# Patient Record
Sex: MALE | Race: WHITE | NOT HISPANIC OR LATINO | Employment: FULL TIME | URBAN - NONMETROPOLITAN AREA
[De-identification: names, ages, dates, MRNs, and addresses within clinical notes are randomized per-mention and may not be internally consistent; named-entity substitution may affect disease eponyms.]

---

## 2017-02-13 RX ORDER — LOSARTAN POTASSIUM AND HYDROCHLOROTHIAZIDE 25; 100 MG/1; MG/1
TABLET ORAL
Qty: 90 TABLET | Refills: 3 | Status: SHIPPED | OUTPATIENT
Start: 2017-02-13 | End: 2018-03-14 | Stop reason: SDUPTHER

## 2017-02-13 RX ORDER — CARVEDILOL 6.25 MG/1
TABLET ORAL
Qty: 180 TABLET | Refills: 3 | Status: SHIPPED | OUTPATIENT
Start: 2017-02-13 | End: 2018-06-28 | Stop reason: SDUPTHER

## 2017-02-23 ENCOUNTER — OFFICE VISIT (OUTPATIENT)
Dept: INTERNAL MEDICINE | Facility: CLINIC | Age: 42
End: 2017-02-23

## 2017-02-23 VITALS
BODY MASS INDEX: 28.17 KG/M2 | DIASTOLIC BLOOD PRESSURE: 90 MMHG | OXYGEN SATURATION: 98 % | HEIGHT: 72 IN | WEIGHT: 208 LBS | HEART RATE: 78 BPM | SYSTOLIC BLOOD PRESSURE: 140 MMHG

## 2017-02-23 DIAGNOSIS — R00.2 PALPITATIONS: Primary | ICD-10-CM

## 2017-02-23 PROCEDURE — 99213 OFFICE O/P EST LOW 20 MIN: CPT | Performed by: INTERNAL MEDICINE

## 2017-02-23 NOTE — PROGRESS NOTES
Subjective  Jan Vyas is a 41 y.o. male    HPI coming in with complains of occasional palpitations with fleeting chest pain without associated cough or hemoptysis. Pain associated with taking a deep breath. No recent travel or leg swelling has started exercising and weightlifting again. Has been taking carvedilol only once a day history of hypertension. Recent stressors at home    The following portions of the patient's history were reviewed and updated as appropriate: allergies, current medications, past family history, past medical history, past social history, past surgical history, and problem list.     Review of Systems   Constitutional: Negative.  Negative for activity change, appetite change, fatigue and fever.   HENT: Negative for congestion, ear discharge, ear pain and trouble swallowing.    Eyes: Negative for photophobia and visual disturbance.   Respiratory: Negative for cough and shortness of breath.    Cardiovascular: Positive for palpitations. Negative for chest pain.   Gastrointestinal: Negative for abdominal distention, abdominal pain, constipation, diarrhea, nausea and vomiting.   Endocrine: Negative.    Genitourinary: Negative for dysuria, hematuria and urgency.        Nocturia   Musculoskeletal: Negative for arthralgias, back pain, joint swelling and myalgias.   Skin: Negative for color change and rash.   Allergic/Immunologic: Negative.    Neurological: Negative for dizziness, weakness, light-headedness and headaches.   Hematological: Negative for adenopathy. Does not bruise/bleed easily.   Psychiatric/Behavioral: Negative for agitation, confusion and dysphoric mood. The patient is nervous/anxious.        Vitals:    02/23/17 1425   BP: 140/90   Pulse: 78   SpO2: 98%       Objective  Physical Exam   Constitutional: He is oriented to person, place, and time. He appears well-developed and well-nourished. No distress.   HENT:   Nose: Nose normal.   Mouth/Throat: Oropharynx is clear and  moist.   Eyes: Conjunctivae and EOM are normal. No scleral icterus.   Neck: No tracheal deviation present. No thyromegaly present.   Cardiovascular: Normal rate and regular rhythm.  Exam reveals no friction rub.    No murmur heard.  Pulmonary/Chest: No respiratory distress. He has no wheezes. He has no rales.   Abdominal: Soft. He exhibits no distension and no mass. There is no tenderness. There is no guarding.   Musculoskeletal: Normal range of motion. He exhibits no deformity.   Lymphadenopathy:     He has no cervical adenopathy.   Neurological: He is alert and oriented to person, place, and time. He has normal reflexes. No cranial nerve deficit. Coordination normal.   Skin: Skin is warm and dry. No rash noted. No erythema.   Psychiatric: He has a normal mood and affect. His behavior is normal. Judgment and thought content normal.       Diagnoses and all orders for this visit:    Palpitations exam unremarkable advised compliance with carvedilol patient reassured. Advised to cut down on caffeine intake. Denies significant alcohol use

## 2017-03-28 ENCOUNTER — TELEPHONE (OUTPATIENT)
Dept: INTERNAL MEDICINE | Facility: CLINIC | Age: 42
End: 2017-03-28

## 2017-03-28 NOTE — TELEPHONE ENCOUNTER
----- Message from Jan Burtflex sent at 3/28/2017 12:44 PM EDT -----  Regarding: Non-Urgent Medical Question  Contact: [phone]  Dr. Hadley,    Greetings to you. Some time back, I met with a doctor in the office concerning overall attention. He gave me a med to take which I think I took for a day or two and discontinued. The past two days, I have taken adderall - yesterday I too a fourth of a 20 mg pill and today I took a half of a 20 mg. I've monitored blood pressure today and has ran in the 140/95 range and heart rate as ran between 70-80 bpm. I do feel more focused - driven (for lack of a better description???) but I'm (overly) conscious of BP and heart rate.  I would love to discuss. Many thanks!!!! Your neurotic best patient -Jan Asael

## 2017-03-28 NOTE — TELEPHONE ENCOUNTER
----- Message from Jan Burtflex sent at 3/28/2017 12:44 PM EDT -----  Regarding: Non-Urgent Medical Question  Contact: [phone]  Dr. Hadley,    Greetings to you. Some time back, I met with a doctor in the office concerning overall attention. He gave me a med to take which I think I took for a day or two and discontinued. The past two days, I have taken adderall - yesterday I too a fourth of a 20 mg pill and today I took a half of a 20 mg. I've monitored blood pressure today and has ran in the 140/95 range and heart rate as ran between 70-80 bpm. I do feel more focused - driven (for lack of a better description???) but I'm (overly) conscious of BP and heart rate.  I would love to discuss. Many thanks!!!! Your neurotic best patient -Jan Asael

## 2017-03-29 ENCOUNTER — TELEPHONE (OUTPATIENT)
Dept: INTERNAL MEDICINE | Facility: CLINIC | Age: 42
End: 2017-03-29

## 2017-03-29 NOTE — TELEPHONE ENCOUNTER
----- Message from Jan Vyas sent at 3/28/2017  1:19 PM EDT -----  Regarding: Non-Urgent Medical Question  Contact: [phone]  Dr. Hadley,    I was reading about the drug the other doctor prescribed for me - Strattera - and noticed that was was used to treat both attention, focus and anxiety. I wonder if this would make a substitute for Lexapro? I'm not necessarily wanting to change anything just was doing what you advise me not to do - read up on meds and conditions. ~Jan

## 2017-03-30 ENCOUNTER — TELEPHONE (OUTPATIENT)
Dept: INTERNAL MEDICINE | Facility: CLINIC | Age: 42
End: 2017-03-30

## 2017-03-30 NOTE — TELEPHONE ENCOUNTER
"----- Message from Jan Vyas sent at 3/29/2017  6:08 PM EDT -----  Regarding: Non-Urgent Medical Question  Contact: [phone]  Opal contacted me and said Strattera would be $400+. To that I say, \"No.\" Thoughts? I'll wait for . Thanks!!!  "

## 2017-08-11 RX ORDER — MONTELUKAST SODIUM 10 MG/1
TABLET ORAL
Qty: 90 TABLET | Refills: 3 | Status: SHIPPED | OUTPATIENT
Start: 2017-08-11 | End: 2018-06-28 | Stop reason: SDUPTHER

## 2017-11-08 ENCOUNTER — OFFICE VISIT (OUTPATIENT)
Dept: INTERNAL MEDICINE | Facility: CLINIC | Age: 42
End: 2017-11-08

## 2017-11-08 VITALS
OXYGEN SATURATION: 95 % | TEMPERATURE: 98 F | HEIGHT: 72 IN | SYSTOLIC BLOOD PRESSURE: 125 MMHG | WEIGHT: 207.13 LBS | HEART RATE: 74 BPM | DIASTOLIC BLOOD PRESSURE: 80 MMHG | BODY MASS INDEX: 28.05 KG/M2

## 2017-11-08 DIAGNOSIS — M77.12 LATERAL EPICONDYLITIS OF LEFT ELBOW: Primary | ICD-10-CM

## 2017-11-08 PROCEDURE — 99213 OFFICE O/P EST LOW 20 MIN: CPT | Performed by: INTERNAL MEDICINE

## 2017-11-08 NOTE — PROGRESS NOTES
"Subjective  Jan Vyas is a 42 y.o. male    HPI coming in with complains of left forearm discomfort especially on the lateral side ongoing for a few weeks now. Exacerbated by heavy lifting. Does do a lot of weight training at the gym. No new trauma has not tried any medication for this    The following portions of the patient's history were reviewed and updated as appropriate: allergies, current medications, past family history, past medical history, past social history, past surgical history, and problem list.     Review of Systems   Constitutional: Negative.  Negative for activity change, appetite change, fatigue and fever.   HENT: Negative for congestion, ear discharge, ear pain and trouble swallowing.    Eyes: Negative for photophobia and visual disturbance.   Respiratory: Negative for cough and shortness of breath.    Cardiovascular: Negative for chest pain and palpitations.   Gastrointestinal: Negative for abdominal distention, abdominal pain, constipation, diarrhea, nausea and vomiting.   Endocrine: Negative.    Genitourinary: Negative for dysuria, hematuria and urgency.   Musculoskeletal: Positive for arthralgias. Negative for back pain, joint swelling and myalgias.        Lt elbow pain   Skin: Negative for color change and rash.   Allergic/Immunologic: Negative.    Neurological: Negative for dizziness, weakness, light-headedness and headaches.   Hematological: Negative for adenopathy. Does not bruise/bleed easily.   Psychiatric/Behavioral: Negative for agitation, confusion and dysphoric mood. The patient is not nervous/anxious.        Visit Vitals   • /80   • Pulse 74   • Temp 98 °F (36.7 °C)   • Ht 72\" (182.9 cm)   • Wt 207 lb 2 oz (94 kg)   • SpO2 95%   • BMI 28.09 kg/m2       Objective  Physical Exam   Constitutional: He is oriented to person, place, and time. He appears well-developed and well-nourished. No distress.   HENT:   Nose: Nose normal.   Mouth/Throat: Oropharynx is clear and " moist.   Eyes: Conjunctivae and EOM are normal. No scleral icterus.   Neck: No tracheal deviation present. No thyromegaly present.   Cardiovascular: Normal rate and regular rhythm.  Exam reveals no friction rub.    No murmur heard.  Pulmonary/Chest: No respiratory distress. He has no wheezes. He has no rales.   Abdominal: Soft. He exhibits no distension and no mass. There is no tenderness. There is no guarding.   Musculoskeletal: Normal range of motion. He exhibits tenderness. He exhibits no deformity.   Lymphadenopathy:     He has no cervical adenopathy.   Neurological: He is alert and oriented to person, place, and time. He has normal reflexes. No cranial nerve deficit. Coordination normal.   Skin: Skin is warm and dry. No rash noted. No erythema.   Psychiatric: He has a normal mood and affect. His behavior is normal. Judgment and thought content normal.       Diagnoses and all orders for this visit:    Lateral epicondylitis of left elbow discussed splinting prescription given intermittent icing avoid heavy lifting. Discussed prognosis

## 2018-03-14 RX ORDER — LOSARTAN POTASSIUM AND HYDROCHLOROTHIAZIDE 25; 100 MG/1; MG/1
TABLET ORAL
Qty: 90 TABLET | Refills: 2 | Status: SHIPPED | OUTPATIENT
Start: 2018-03-14 | End: 2018-11-11 | Stop reason: SDUPTHER

## 2018-03-14 RX ORDER — ESCITALOPRAM OXALATE 10 MG/1
10 TABLET ORAL DAILY
Qty: 90 TABLET | Refills: 3 | Status: SHIPPED | OUTPATIENT
Start: 2018-03-14 | End: 2019-05-02 | Stop reason: SDUPTHER

## 2018-06-01 ENCOUNTER — TELEPHONE (OUTPATIENT)
Dept: INTERNAL MEDICINE | Facility: CLINIC | Age: 43
End: 2018-06-01

## 2018-06-01 RX ORDER — LEVOCETIRIZINE DIHYDROCHLORIDE 5 MG/1
5 TABLET, FILM COATED ORAL EVERY EVENING
Qty: 30 TABLET | Refills: 5 | Status: SHIPPED | OUTPATIENT
Start: 2018-06-01 | End: 2019-01-03 | Stop reason: SDUPTHER

## 2018-06-01 NOTE — TELEPHONE ENCOUNTER
Patient would like a script for Xyzal sent to [facility]. States that the [facility] is no longer working for him this allergy season. Please call patient.

## 2018-06-29 RX ORDER — CARVEDILOL 6.25 MG/1
TABLET ORAL
Qty: 180 TABLET | Refills: 0 | Status: SHIPPED | OUTPATIENT
Start: 2018-06-29 | End: 2018-10-03 | Stop reason: SDUPTHER

## 2018-06-29 RX ORDER — MONTELUKAST SODIUM 10 MG/1
TABLET ORAL
Qty: 90 TABLET | Refills: 0 | Status: SHIPPED | OUTPATIENT
Start: 2018-06-29 | End: 2018-10-03 | Stop reason: SDUPTHER

## 2018-10-03 RX ORDER — CARVEDILOL 6.25 MG/1
6.25 TABLET ORAL 2 TIMES DAILY WITH MEALS
Qty: 180 TABLET | Refills: 0 | Status: SHIPPED | OUTPATIENT
Start: 2018-10-03 | End: 2019-06-08 | Stop reason: SDUPTHER

## 2018-10-03 RX ORDER — MONTELUKAST SODIUM 10 MG/1
TABLET ORAL
Qty: 90 TABLET | Refills: 0 | Status: SHIPPED | OUTPATIENT
Start: 2018-10-03 | End: 2019-03-05 | Stop reason: SDUPTHER

## 2018-11-12 RX ORDER — LOSARTAN POTASSIUM AND HYDROCHLOROTHIAZIDE 25; 100 MG/1; MG/1
TABLET ORAL
Qty: 90 TABLET | Refills: 3 | Status: SHIPPED | OUTPATIENT
Start: 2018-11-12 | End: 2019-10-28 | Stop reason: SDUPTHER

## 2019-01-04 RX ORDER — LEVOCETIRIZINE DIHYDROCHLORIDE 5 MG/1
TABLET, FILM COATED ORAL
Qty: 30 TABLET | Refills: 11 | Status: SHIPPED | OUTPATIENT
Start: 2019-01-04 | End: 2019-10-24 | Stop reason: SDUPTHER

## 2019-01-25 ENCOUNTER — OFFICE VISIT (OUTPATIENT)
Dept: INTERNAL MEDICINE | Facility: CLINIC | Age: 44
End: 2019-01-25

## 2019-01-25 VITALS
SYSTOLIC BLOOD PRESSURE: 128 MMHG | TEMPERATURE: 98 F | DIASTOLIC BLOOD PRESSURE: 98 MMHG | BODY MASS INDEX: 29.26 KG/M2 | HEIGHT: 72 IN | OXYGEN SATURATION: 98 % | WEIGHT: 216 LBS | HEART RATE: 81 BPM

## 2019-01-25 DIAGNOSIS — Z78.9 ALCOHOL USE: ICD-10-CM

## 2019-01-25 DIAGNOSIS — Z23 NEED FOR TDAP VACCINATION: ICD-10-CM

## 2019-01-25 DIAGNOSIS — K21.9 GASTROESOPHAGEAL REFLUX DISEASE, ESOPHAGITIS PRESENCE NOT SPECIFIED: ICD-10-CM

## 2019-01-25 DIAGNOSIS — I10 ESSENTIAL HYPERTENSION: ICD-10-CM

## 2019-01-25 DIAGNOSIS — J01.40 ACUTE PANSINUSITIS, RECURRENCE NOT SPECIFIED: Primary | ICD-10-CM

## 2019-01-25 PROCEDURE — 90471 IMMUNIZATION ADMIN: CPT | Performed by: NURSE PRACTITIONER

## 2019-01-25 PROCEDURE — 99214 OFFICE O/P EST MOD 30 MIN: CPT | Performed by: NURSE PRACTITIONER

## 2019-01-25 PROCEDURE — 90715 TDAP VACCINE 7 YRS/> IM: CPT | Performed by: NURSE PRACTITIONER

## 2019-01-25 RX ORDER — DOXYCYCLINE 100 MG/1
100 TABLET ORAL 2 TIMES DAILY
Qty: 20 TABLET | Refills: 0 | Status: SHIPPED | OUTPATIENT
Start: 2019-01-25 | End: 2019-02-04

## 2019-01-25 RX ORDER — FLUTICASONE PROPIONATE 50 MCG
2 SPRAY, SUSPENSION (ML) NASAL DAILY
Qty: 1 BOTTLE | Refills: 5 | Status: SHIPPED | OUTPATIENT
Start: 2019-01-25 | End: 2020-04-06

## 2019-01-25 RX ORDER — RIZATRIPTAN BENZOATE 10 MG/1
10 TABLET ORAL ONCE AS NEEDED
Qty: 9 TABLET | Refills: 5 | Status: SHIPPED | OUTPATIENT
Start: 2019-01-25 | End: 2021-02-02 | Stop reason: ALTCHOICE

## 2019-01-25 RX ORDER — RANITIDINE 300 MG/1
300 TABLET ORAL NIGHTLY
Qty: 30 TABLET | Refills: 1 | Status: SHIPPED | OUTPATIENT
Start: 2019-01-25 | End: 2020-01-13

## 2019-01-25 NOTE — PROGRESS NOTES
Chief Complaint / Reason:      Chief Complaint   Patient presents with   • Cough     cold. no fever. congestion. headache late at hs.    • Hypertension     talk about recalls and losartan.    • Heartburn     makes him cough       Subjective     HPI  Patient presents today with complaints of cough and congestion headache heartburn and elevated blood pressure.  He states he feels like he has pain on the right side he is not up-to-date on his vision.  He denies fever or chills.  He states he is taking his medication as prescribed he would like to discuss recalls about losartan and I advised him to contact the pharmacy and discussed other options with him such as lisinopril and Avapro.  Patient states his respiratory symptoms started last Friday and he feels like they initially were improving but have worsened over the past few days.  Vital signs are stable with exception of slightly elevated blood pressure.  He states he does drink quite a bit of coffee and does drink alcohol nightly.  He occasionally dips but not often.  He states he has been under a lot of stress as he has been going through divorce.  Denies chest pain, shortness of breath or heart palpitations.  Patient is a principal at [employer].    History taken from: patient    PMH/FH/Social History were reviewed and updated appropriately in the electronic medical record.     Review of Systems:   Review of Systems   Constitutional: Positive for fatigue.   HENT: Positive for congestion, sinus pressure, sinus pain and sore throat.    Respiratory: Positive for cough.    Cardiovascular: Negative.    Gastrointestinal: Negative.    Skin: Negative.    Neurological: Positive for headaches.   Hematological: Negative for adenopathy.     All other systems were reviewed and are negative.  Exceptions are noted in the subjective or above.      Objective     Vital Signs  Vitals:    01/25/19 1315   BP: 128/98   Pulse: 81   Temp: 98 °F (36.7 °C)   SpO2: 98%       Body mass index  is 29.29 kg/m².    Physical Exam   Constitutional: He is oriented to person, place, and time. He appears well-developed and well-nourished.   HENT:   Head: Normocephalic.   Right Ear: External ear normal. Tympanic membrane is erythematous and bulging.   Left Ear: External ear normal. Tympanic membrane is erythematous and bulging.   Nose: Right sinus exhibits maxillary sinus tenderness and frontal sinus tenderness. Left sinus exhibits maxillary sinus tenderness and frontal sinus tenderness.   Mouth/Throat: Mucous membranes are dry. Posterior oropharyngeal erythema present.   Cardiovascular: Normal rate, regular rhythm, normal heart sounds and intact distal pulses.   Pulmonary/Chest: Effort normal and breath sounds normal.   Abdominal: Soft. Bowel sounds are normal.   Lymphadenopathy:     He has no cervical adenopathy.   Neurological: He is alert and oriented to person, place, and time.   Skin: Skin is warm and dry.   Psychiatric: He has a normal mood and affect. His behavior is normal. Judgment and thought content normal.   Nursing note and vitals reviewed.       Results Review:    I reviewed the patient's new clinical results.       Medication Review:     Current Outpatient Medications:   •  carvedilol (COREG) 6.25 MG tablet, Take 1 tablet by mouth 2 (Two) Times a Day With Meals. Must be seen for further refills, Disp: 180 tablet, Rfl: 0  •  escitalopram (LEXAPRO) 10 MG tablet, Take 1 tablet by mouth Daily. (Patient taking differently: Take 5 mg by mouth Daily.), Disp: 90 tablet, Rfl: 3  •  ibuprofen (ADVIL,MOTRIN) 800 MG tablet, Take  by mouth., Disp: , Rfl:   •  levocetirizine (XYZAL) 5 MG tablet, TAKE ONE TABLET BY MOUTH EVERY EVENING, Disp: 30 tablet, Rfl: 11  •  losartan-hydrochlorothiazide (HYZAAR) 100-25 MG per tablet, TAKE ONE TABLET BY MOUTH DAILY, Disp: 90 tablet, Rfl: 3  •  montelukast (SINGULAIR) 10 MG tablet, TAKE ONE TABLET BY MOUTH DAILY, Disp: 90 tablet, Rfl: 0  •  doxycycline (ADOXA) 100 MG tablet,  Take 1 tablet by mouth 2 (Two) Times a Day for 10 days., Disp: 20 tablet, Rfl: 0  •  fluticasone (FLONASE) 50 MCG/ACT nasal spray, 2 sprays into the nostril(s) as directed by provider Daily., Disp: 1 bottle, Rfl: 5  •  raNITIdine (ZANTAC) 300 MG tablet, Take 1 tablet by mouth Every Night., Disp: 30 tablet, Rfl: 1  •  rizatriptan (MAXALT) 10 MG tablet, Take 1 tablet by mouth 1 (One) Time As Needed for Migraine for up to 1 dose. May repeat in 2 hours if needed, Disp: 9 tablet, Rfl: 5    Assessment/Plan   Jan was seen today for cough, hypertension and heartburn.    Diagnoses and all orders for this visit:    Acute pansinusitis, recurrence not specified  -     doxycycline (ADOXA) 100 MG tablet; Take 1 tablet by mouth 2 (Two) Times a Day for 10 days.    Need for Tdap vaccination  -     Tdap Vaccine Greater Than or Equal To 6yo IM    Gastroesophageal reflux disease, esophagitis presence not specified  -     raNITIdine (ZANTAC) 300 MG tablet; Take 1 tablet by mouth Every Night.  Avoid eating spicy foods  Avoid caffeinated beverages  Avoid carbonated beverages  Do not eat or drink within 2-3 hours of going to bed in the evening  Elevate head of bed on 4-6 inch blocks  Eat smaller portions of food throughout the day    Essential hypertension   Initiate lifestyle modifications.   DASH Diet and exercise.   Compliance with medication regimen and discussed ways to prevent of long-term complications from high blood pressure.  Discussed when to seek medical attention.  Encouraged patient to take blood pressure daily and keep a log.      Alcohol use   Advised patient to cut down on alcohol and caffeine  Other orders  -     rizatriptan (MAXALT) 10 MG tablet; Take 1 tablet by mouth 1 (One) Time As Needed for Migraine for up to 1 dose. May repeat in 2 hours if needed  -     fluticasone (FLONASE) 50 MCG/ACT nasal spray; 2 sprays into the nostril(s) as directed by provider Daily.        Return if symptoms worsen or fail to  improve.    Piper Damico, APRN  01/25/2019

## 2019-01-30 ENCOUNTER — TELEPHONE (OUTPATIENT)
Dept: INTERNAL MEDICINE | Facility: CLINIC | Age: 44
End: 2019-01-30

## 2019-01-31 ENCOUNTER — CLINICAL SUPPORT (OUTPATIENT)
Dept: INTERNAL MEDICINE | Facility: CLINIC | Age: 44
End: 2019-01-31

## 2019-01-31 DIAGNOSIS — J06.9 UPPER RESPIRATORY TRACT INFECTION, UNSPECIFIED TYPE: Primary | ICD-10-CM

## 2019-01-31 DIAGNOSIS — J06.9 UPPER RESPIRATORY TRACT INFECTION, UNSPECIFIED TYPE: ICD-10-CM

## 2019-01-31 PROCEDURE — 96372 THER/PROPH/DIAG INJ SC/IM: CPT | Performed by: NURSE PRACTITIONER

## 2019-01-31 RX ORDER — CEFTRIAXONE 1 G/1
0.5 INJECTION, POWDER, FOR SOLUTION INTRAMUSCULAR; INTRAVENOUS ONCE
Status: COMPLETED | OUTPATIENT
Start: 2019-01-31 | End: 2019-01-31

## 2019-01-31 RX ADMIN — CEFTRIAXONE 0.5 G: 1 INJECTION, POWDER, FOR SOLUTION INTRAMUSCULAR; INTRAVENOUS at 11:17

## 2019-03-04 ENCOUNTER — TELEPHONE (OUTPATIENT)
Dept: INTERNAL MEDICINE | Facility: CLINIC | Age: 44
End: 2019-03-04

## 2019-03-04 NOTE — TELEPHONE ENCOUNTER
Patient called and states he seen on the news that his losartan has been recalled. He states he does not feel it is working anyway and would like to get this changed to a different blood pressure medication.

## 2019-03-06 RX ORDER — MONTELUKAST SODIUM 10 MG/1
TABLET ORAL
Qty: 90 TABLET | Refills: 3 | Status: SHIPPED | OUTPATIENT
Start: 2019-03-06 | End: 2020-04-06

## 2019-05-03 RX ORDER — ESCITALOPRAM OXALATE 10 MG/1
TABLET ORAL
Qty: 90 TABLET | Refills: 2 | Status: SHIPPED | OUTPATIENT
Start: 2019-05-03 | End: 2019-10-28 | Stop reason: SDUPTHER

## 2019-06-08 RX ORDER — CARVEDILOL 6.25 MG/1
TABLET ORAL
Qty: 180 TABLET | Refills: 0 | Status: SHIPPED | OUTPATIENT
Start: 2019-06-08 | End: 2019-10-24 | Stop reason: SDUPTHER

## 2019-10-24 RX ORDER — CARVEDILOL 6.25 MG/1
TABLET ORAL
Qty: 180 TABLET | Refills: 0 | Status: SHIPPED | OUTPATIENT
Start: 2019-10-24 | End: 2019-10-28 | Stop reason: SDUPTHER

## 2019-10-24 RX ORDER — LEVOCETIRIZINE DIHYDROCHLORIDE 5 MG/1
TABLET, FILM COATED ORAL
Qty: 90 TABLET | Refills: 0 | Status: SHIPPED | OUTPATIENT
Start: 2019-10-24 | End: 2020-01-13

## 2019-10-28 RX ORDER — LOSARTAN POTASSIUM AND HYDROCHLOROTHIAZIDE 25; 100 MG/1; MG/1
1 TABLET ORAL DAILY
Qty: 90 TABLET | Refills: 3 | Status: SHIPPED | OUTPATIENT
Start: 2019-10-28 | End: 2021-01-16

## 2019-10-28 RX ORDER — ESCITALOPRAM OXALATE 10 MG/1
10 TABLET ORAL DAILY
Qty: 90 TABLET | Refills: 3 | Status: SHIPPED | OUTPATIENT
Start: 2019-10-28 | End: 2021-02-02 | Stop reason: ALTCHOICE

## 2019-10-28 RX ORDER — CARVEDILOL 6.25 MG/1
6.25 TABLET ORAL 2 TIMES DAILY WITH MEALS
Qty: 180 TABLET | Refills: 3 | Status: SHIPPED | OUTPATIENT
Start: 2019-10-28 | End: 2020-10-02 | Stop reason: SDUPTHER

## 2019-12-28 ENCOUNTER — OFFICE VISIT (OUTPATIENT)
Dept: INTERNAL MEDICINE | Facility: CLINIC | Age: 44
End: 2019-12-28

## 2019-12-28 VITALS
TEMPERATURE: 98.8 F | BODY MASS INDEX: 29.39 KG/M2 | SYSTOLIC BLOOD PRESSURE: 130 MMHG | OXYGEN SATURATION: 98 % | WEIGHT: 217 LBS | RESPIRATION RATE: 16 BRPM | DIASTOLIC BLOOD PRESSURE: 84 MMHG | HEIGHT: 72 IN | HEART RATE: 77 BPM

## 2019-12-28 DIAGNOSIS — J06.9 VIRAL UPPER RESPIRATORY TRACT INFECTION: Primary | ICD-10-CM

## 2019-12-28 PROCEDURE — 99213 OFFICE O/P EST LOW 20 MIN: CPT | Performed by: INTERNAL MEDICINE

## 2019-12-28 RX ORDER — ALBUTEROL SULFATE 90 UG/1
AEROSOL, METERED RESPIRATORY (INHALATION) EVERY 4 HOURS
COMMUNITY
End: 2020-11-17

## 2019-12-28 RX ORDER — AMOXICILLIN AND CLAVULANATE POTASSIUM 875; 125 MG/1; MG/1
TABLET, FILM COATED ORAL EVERY 12 HOURS
COMMUNITY
End: 2020-03-24

## 2019-12-28 RX ORDER — BROMPHENIRAMINE MALEATE, PSEUDOEPHEDRINE HYDROCHLORIDE, AND DEXTROMETHORPHAN HYDROBROMIDE 2; 30; 10 MG/5ML; MG/5ML; MG/5ML
5 SYRUP ORAL 4 TIMES DAILY PRN
Qty: 240 ML | Refills: 0 | Status: SHIPPED | OUTPATIENT
Start: 2019-12-28 | End: 2020-03-24

## 2019-12-28 NOTE — PROGRESS NOTES
Subjective     Patient ID: Jan Vyas is a 44 y.o. male. Patient is here for management of multiple medical problems.     Chief Complaint   Patient presents with   • Sore Throat     Onset last Thursday, pt went to  and gave him Augmentin pt states he felt worse on Friday and they gave him a steroid pack.    • Cough     Pt states when he coughs he wheezes   • URI     History of Present Illness       Fever chills and s/t.  102.  Given rocephin inj and Augmentin. And then called back with increased fever.  Friday. Then Saturday worse and got another steroid inj.   Tylenol and IBU alternation. Increase congestion in chest and wheezing.    Took tylenol last night only.  No fever today.           The following portions of the patient's history were reviewed and updated as appropriate: allergies, current medications, past family history, past medical history, past social history, past surgical history and problem list.    Review of Systems   Constitutional: Negative for fatigue.   Cardiovascular: Negative for chest pain and leg swelling.   Psychiatric/Behavioral: Negative for self-injury and sleep disturbance. The patient is not nervous/anxious and is not hyperactive.    All other systems reviewed and are negative.      Current Outpatient Medications:   •  albuterol sulfate  (90 Base) MCG/ACT inhaler, Every 4 (Four) Hours., Disp: , Rfl:   •  amoxicillin-clavulanate (AUGMENTIN) 875-125 MG per tablet, Every 12 (Twelve) Hours., Disp: , Rfl:   •  carvedilol (COREG) 6.25 MG tablet, Take 1 tablet by mouth 2 (Two) Times a Day With Meals., Disp: 180 tablet, Rfl: 3  •  escitalopram (LEXAPRO) 10 MG tablet, Take 1 tablet by mouth Daily., Disp: 90 tablet, Rfl: 3  •  fluticasone (FLONASE) 50 MCG/ACT nasal spray, 2 sprays into the nostril(s) as directed by provider Daily., Disp: 1 bottle, Rfl: 5  •  ibuprofen (ADVIL,MOTRIN) 800 MG tablet, Take  by mouth., Disp: , Rfl:   •  levocetirizine (XYZAL) 5 MG tablet, TAKE ONE  "TABLET BY MOUTH EVERY EVENING, Disp: 90 tablet, Rfl: 0  •  losartan-hydrochlorothiazide (HYZAAR) 100-25 MG per tablet, Take 1 tablet by mouth Daily., Disp: 90 tablet, Rfl: 3  •  montelukast (SINGULAIR) 10 MG tablet, TAKE ONE TABLET BY MOUTH DAILY, Disp: 90 tablet, Rfl: 3  •  PredniSONE 5 MG tablet therapy pack dosepak, Take 1 tablet by mouth Take As Directed. Take as directed on package instructions., Disp: 21 each, Rfl: 0  •  raNITIdine (ZANTAC) 300 MG tablet, Take 1 tablet by mouth Every Night., Disp: 30 tablet, Rfl: 1  •  rizatriptan (MAXALT) 10 MG tablet, Take 1 tablet by mouth 1 (One) Time As Needed for Migraine for up to 1 dose. May repeat in 2 hours if needed, Disp: 9 tablet, Rfl: 5  •  brompheniramine-pseudoephedrine-DM 30-2-10 MG/5ML syrup, Take 5 mL by mouth 4 (Four) Times a Day As Needed for Congestion or Allergies., Disp: 240 mL, Rfl: 0    Objective      Blood pressure 130/84, pulse 77, temperature 98.8 °F (37.1 °C), temperature source Temporal, resp. rate 16, height 182.9 cm (72.01\"), weight 98.4 kg (217 lb), SpO2 98 %.    Physical Exam     General Appearance:    Alert, cooperative, no distress, appears stated age   Head:    Normocephalic, without obvious abnormality, atraumatic   Eyes:    PERRL, conjunctiva/corneas clear, EOM's intact   Ears:    Normal TM's and external ear canals, both ears   Nose:   Nares normal, septum midline, mucosa normal, no drainage   or sinus tenderness   Throat:   Lips, mucosa, and tongue normal; teeth and gums normal   Neck:   Supple, symmetrical, trachea midline, no adenopathy;        thyroid:  No enlargement/tenderness/nodules; no carotid    bruit or JVD   Back:     Symmetric, no curvature, ROM normal, no CVA tenderness   Lungs:     Clear to auscultation bilaterally, respirations unlabored   Chest wall:    No tenderness or deformity   Heart:    Regular rate and rhythm, S1 and S2 normal, no murmur,        rub or gallop   Abdomen:     Soft, non-tender, bowel sounds active " all four quadrants,     no masses, no organomegaly   Extremities:   Extremities normal, atraumatic, no cyanosis or edema   Pulses:   2+ and symmetric all extremities   Skin:   Skin color, texture, turgor normal, no rashes or lesions   Lymph nodes:   Cervical, supraclavicular, and axillary nodes normal   Neurologic:   CNII-XII intact. Normal strength, sensation and reflexes       throughout      Results for orders placed or performed in visit on 01/21/16   Comprehensive metabolic panel   Result Value Ref Range    Glucose 80 65 - 99 mg/dL    BUN 15 6 - 20 mg/dL    Creatinine 1.15 0.76 - 1.27 mg/dL    eGFR Non African Am 80 >59 mL/min/1.73    eGFR African Am 92 >59 mL/min/1.73    BUN/Creatinine Ratio 13 8 - 19    Sodium 142 134 - 144 mmol/L    Potassium 4.1 3.5 - 5.2 mmol/L    Chloride 98 97 - 108 mmol/L    Total CO2 24 18 - 29 mmol/L    Calcium 9.7 8.7 - 10.2 mg/dL    Total Protein 7.3 6.0 - 8.5 g/dL    Albumin 4.6 3.5 - 5.5 g/dL    Globulin 2.7 1.5 - 4.5 g/dL    A/G Ratio 1.7 1.1 - 2.5    Total Bilirubin 0.4 0.0 - 1.2 mg/dL    Alkaline Phosphatase 64 39 - 117 IU/L    AST (SGOT) 21 0 - 40 IU/L    ALT (SGPT) 27 0 - 44 IU/L   Testosterone, free, total   Result Value Ref Range    Testosterone, Total 220 (L) 348 - 1,197 ng/dL    Comment      Testosterone, Free 5.6 (L) 8.7 - 25.1 pg/mL   TSH   Result Value Ref Range    TSH 1.310 0.450 - 4.500 uIU/mL         Assessment/Plan       Jan was seen today for sore throat, cough and uri.    Diagnoses and all orders for this visit:    Viral upper respiratory tract infection    Other orders  -     brompheniramine-pseudoephedrine-DM 30-2-10 MG/5ML syrup; Take 5 mL by mouth 4 (Four) Times a Day As Needed for Congestion or Allergies.      No follow-ups on file.          There are no Patient Instructions on file for this visit.     Logan Phillip MD    Assessment/Plan

## 2020-01-11 DIAGNOSIS — K21.9 GASTROESOPHAGEAL REFLUX DISEASE, ESOPHAGITIS PRESENCE NOT SPECIFIED: ICD-10-CM

## 2020-01-13 RX ORDER — LEVOCETIRIZINE DIHYDROCHLORIDE 5 MG/1
TABLET, FILM COATED ORAL
Qty: 90 TABLET | Refills: 0 | Status: SHIPPED | OUTPATIENT
Start: 2020-01-13 | End: 2020-04-06

## 2020-01-13 RX ORDER — RANITIDINE 300 MG/1
TABLET ORAL
Qty: 30 TABLET | Refills: 5 | Status: SHIPPED | OUTPATIENT
Start: 2020-01-13 | End: 2020-10-19

## 2020-01-14 RX ORDER — TRAZODONE HYDROCHLORIDE 50 MG/1
50 TABLET ORAL NIGHTLY
Qty: 30 TABLET | Refills: 5 | Status: SHIPPED | OUTPATIENT
Start: 2020-01-14 | End: 2020-11-30

## 2020-03-24 ENCOUNTER — TELEPHONE (OUTPATIENT)
Dept: INTERNAL MEDICINE | Facility: CLINIC | Age: 45
End: 2020-03-24

## 2020-03-24 ENCOUNTER — OFFICE VISIT (OUTPATIENT)
Dept: INTERNAL MEDICINE | Facility: CLINIC | Age: 45
End: 2020-03-24

## 2020-03-24 VITALS
BODY MASS INDEX: 29.66 KG/M2 | HEIGHT: 72 IN | TEMPERATURE: 98.3 F | OXYGEN SATURATION: 97 % | SYSTOLIC BLOOD PRESSURE: 130 MMHG | DIASTOLIC BLOOD PRESSURE: 98 MMHG | HEART RATE: 89 BPM | WEIGHT: 219 LBS

## 2020-03-24 DIAGNOSIS — R11.2 NAUSEA AND VOMITING, INTRACTABILITY OF VOMITING NOT SPECIFIED, UNSPECIFIED VOMITING TYPE: ICD-10-CM

## 2020-03-24 DIAGNOSIS — R61 DIAPHORESIS: ICD-10-CM

## 2020-03-24 DIAGNOSIS — F41.9 ANXIETY: Primary | ICD-10-CM

## 2020-03-24 DIAGNOSIS — E29.1 HYPOGONADISM IN MALE: ICD-10-CM

## 2020-03-24 DIAGNOSIS — R19.7 DIARRHEA, UNSPECIFIED TYPE: ICD-10-CM

## 2020-03-24 DIAGNOSIS — R00.2 PALPITATIONS: ICD-10-CM

## 2020-03-24 DIAGNOSIS — I10 ESSENTIAL HYPERTENSION: ICD-10-CM

## 2020-03-24 PROCEDURE — 99214 OFFICE O/P EST MOD 30 MIN: CPT | Performed by: NURSE PRACTITIONER

## 2020-03-24 RX ORDER — BUSPIRONE HYDROCHLORIDE 10 MG/1
10 TABLET ORAL 3 TIMES DAILY
Qty: 90 TABLET | Refills: 1 | Status: SHIPPED | OUTPATIENT
Start: 2020-03-24 | End: 2020-07-15

## 2020-03-24 NOTE — TELEPHONE ENCOUNTER
Patient called stating that he was vomiting yesterday at 8 am and throw up all day. He is still still feeling nauseous today.  He has no other symptoms other than he is anxious. He would like a call back to know if he needs to come in or not.     Opal Reddy

## 2020-03-24 NOTE — PROGRESS NOTES
Date: 2020    Name: Jan Vyas  : 1975    Chief Complaint:   Chief Complaint   Patient presents with   • Vomiting     diarrhea,nausea       HPI:  Jan Vyas is a 44 y.o. male presents with 2 day history of n/v/d. He began vomiting in the middle of the night after eating lasagne for dinner.  His family ate dinner with him, none have had GI upset.  He has not vomited or felt nauseated in > 24 hours.  He has had diarrhea 3-4 x in the past 24 hours.  Denies blood in stool, dark sticky stool, fatigue, chills, headache, abdominal pain, excessive belching or flatulence, myalgia, cough, chest pain, dysuria, lower back pain, decreased appetite.    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  He has been having some shortness of breath, palpitations when thinking about getting sick; here to be certain he does not meet qualifications to be considered to be tested.  He has felt hot, occasionally sweaty as well when worrying about illness.  He has had symptoms like this with anxiety before.  Denies headache, chest/jaw/arm/neck pain, dizziness, orthopnea, cough, weakness, fatigue, lower extremity edema, confusion, vision changes.  He takes 1/2 prescribed dose of lexapro (5 mg).  He does take carvedilol 6.25 mg, losartan-HCTZ 100-25 mg daily for high blood pressure.  Does not monitor blood pressure at home.   He has stopped taking testosterone in the past few weeks.      History:  The following portions of the patient's history were reviewed and updated as appropriate: allergies, current medications, past medical history, family history, surgical history, social history and problem list.     ROS:  Review of Systems   Constitutional: Negative for activity change, appetite change, unexpected weight gain and unexpected  "weight loss.   HENT: Negative.    Respiratory: Negative for choking and chest tightness.    Gastrointestinal: Negative for abdominal distention and anal bleeding.   Musculoskeletal: Negative for myalgias.   Skin: Negative for pallor and rash.   Neurological: Negative for facial asymmetry, speech difficulty and memory problem.   Hematological: Does not bruise/bleed easily.   Psychiatric/Behavioral: Positive for decreased concentration and stress. Negative for hallucinations, self-injury and suicidal ideas. The patient is nervous/anxious.        VS:  Vitals:    03/24/20 1257 03/24/20 1307 03/24/20 1329   BP: (!) 168/116  130/98   Pulse: 89     Temp: 97.9 °F (36.6 °C) 98.3 °F (36.8 °C)    TempSrc: Temporal Oral    SpO2: 97%     Weight: 99.3 kg (219 lb)     Height: 182.9 cm (72.01\")       Body mass index is 29.69 kg/m².    PE:  Physical Exam   Constitutional: He is oriented to person, place, and time. He appears well-developed and well-nourished. No distress.   HENT:   Head: Normocephalic.   Nose: Nose normal.   Mouth/Throat: Oropharynx is clear and moist.   Eyes: Pupils are equal, round, and reactive to light. Conjunctivae and EOM are normal.   Neck: Normal range of motion. Neck supple.   Cardiovascular: Normal rate, regular rhythm, normal heart sounds and intact distal pulses.   Pulmonary/Chest: Effort normal and breath sounds normal.   Abdominal: Soft. Bowel sounds are normal. There is no hepatosplenomegaly. There is no tenderness. There is no rigidity, no rebound, no guarding, no CVA tenderness, no tenderness at McBurney's point and negative Osorio's sign.   Lymphadenopathy:     He has no cervical adenopathy.   Neurological: He is alert and oriented to person, place, and time.   Skin: Skin is warm. Capillary refill takes less than 2 seconds.   Psychiatric: His speech is normal. His mood appears anxious. He is agitated.       Assessment/Plan:  Jan was seen today for vomiting.    Diagnoses and all orders for this " visit:    Anxiety  -     busPIRone (BUSPAR) 10 MG tablet; Take 1 tablet by mouth 3 (Three) Times a Day. Advised to take first dose HS to determine possible side effects at home.  -     T4, Free  -     TSH  - Take lexapro 10 mg daily as prescribed        - Encouraged to take part in daily physical exercise.  Discussed yoga, breanna chi in particular.         - Eat healthy, well balanced diet; avoid sugary foods or beverages        - Limit alcohol intake        - Ensure good night's sleep by creating calm space in bedroom, avoiding screen time 1-2 hours before bed, no caffeine after 5 pm        - Talk to supportive family and friends, as needed        - Declined referral to Behavioral Health  Palpitations  -     CBC & Differential  -     T4, Free  -     TSH  - Refused EKG, as he states he has had them in the past when feeling anxious and they have been normal.  Diaphoresis  -     Comprehensive Metabolic Panel  -     T4, Free  -     TSH  Nausea and vomiting, intractability of vomiting not specified, unspecified vomiting type  -     CBC & Differential  Diarrhea, unspecified type  -     CBC & Differential        -  Discussed oral rehydration, reintroduction of solid foods, signs of dehydration.        -     Stop eating solid foods for a few hours.        -     Try sucking on ice chips or taking small sips of water. May also try drinking clear soda, clear broths or noncaffeinated sports drinks. Drink plenty of liquid every day, taking small, frequent sips.        -  Gradually begin to eat bland, easy-to-digest foods, such as soda crackers, toast, gelatin, bananas, rice and chicken. Stop eating if your nausea returns.        -     Avoid dairy products, caffeine, and greasy or highly seasoned foods.        -     Get plenty of rest. The illness and dehydration may have made you weak and tired.        -     Practice good hand hygiene.        -     Return to clinic or go to emergency department if worsening symptoms, blood or bile  in emesis or stool, signs of dehydration,  or diarrhea lasting longer than 5 days or any new concerns.  Essential hypertension        - Follow heart healthy diet.  Advised to reduce daily sodium intake to < 1500 mg per day.  Avoid processed & fast foods.        - Monitor blood pressure as discussed, keep log and bring to next appointment.          - Exercise as tolerated, with a goal of 30 minutes of moderate exercise most days.         - Take medications as prescribed.  Hypogonadism in male      Return in about 4 weeks (around 4/21/2020) for Next scheduled follow up.

## 2020-03-25 LAB
ALBUMIN SERPL-MCNC: 4.9 G/DL (ref 3.5–5.2)
ALBUMIN/GLOB SERPL: 1.7 G/DL
ALP SERPL-CCNC: 63 U/L (ref 39–117)
ALT SERPL-CCNC: 50 U/L (ref 1–41)
AST SERPL-CCNC: 33 U/L (ref 1–40)
BASOPHILS # BLD AUTO: 0.04 10*3/MM3 (ref 0–0.2)
BASOPHILS NFR BLD AUTO: 0.6 % (ref 0–1.5)
BILIRUB SERPL-MCNC: 0.8 MG/DL (ref 0.2–1.2)
BUN SERPL-MCNC: 19 MG/DL (ref 6–20)
BUN/CREAT SERPL: 16.5 (ref 7–25)
CALCIUM SERPL-MCNC: 10.1 MG/DL (ref 8.6–10.5)
CHLORIDE SERPL-SCNC: 96 MMOL/L (ref 98–107)
CO2 SERPL-SCNC: 28.6 MMOL/L (ref 22–29)
CREAT SERPL-MCNC: 1.15 MG/DL (ref 0.76–1.27)
EOSINOPHIL # BLD AUTO: 0.05 10*3/MM3 (ref 0–0.4)
EOSINOPHIL NFR BLD AUTO: 0.8 % (ref 0.3–6.2)
ERYTHROCYTE [DISTWIDTH] IN BLOOD BY AUTOMATED COUNT: 12.4 % (ref 12.3–15.4)
GLOBULIN SER CALC-MCNC: 2.9 GM/DL
GLUCOSE SERPL-MCNC: 102 MG/DL (ref 65–99)
HCT VFR BLD AUTO: 41.9 % (ref 37.5–51)
HGB BLD-MCNC: 14.8 G/DL (ref 13–17.7)
IMM GRANULOCYTES # BLD AUTO: 0.02 10*3/MM3 (ref 0–0.05)
IMM GRANULOCYTES NFR BLD AUTO: 0.3 % (ref 0–0.5)
LYMPHOCYTES # BLD AUTO: 1.98 10*3/MM3 (ref 0.7–3.1)
LYMPHOCYTES NFR BLD AUTO: 30.9 % (ref 19.6–45.3)
MCH RBC QN AUTO: 31.1 PG (ref 26.6–33)
MCHC RBC AUTO-ENTMCNC: 35.3 G/DL (ref 31.5–35.7)
MCV RBC AUTO: 88 FL (ref 79–97)
MONOCYTES # BLD AUTO: 0.38 10*3/MM3 (ref 0.1–0.9)
MONOCYTES NFR BLD AUTO: 5.9 % (ref 5–12)
NEUTROPHILS # BLD AUTO: 3.94 10*3/MM3 (ref 1.7–7)
NEUTROPHILS NFR BLD AUTO: 61.5 % (ref 42.7–76)
NRBC BLD AUTO-RTO: 0 /100 WBC (ref 0–0.2)
PLATELET # BLD AUTO: 280 10*3/MM3 (ref 140–450)
POTASSIUM SERPL-SCNC: 4.1 MMOL/L (ref 3.5–5.2)
PROT SERPL-MCNC: 7.8 G/DL (ref 6–8.5)
RBC # BLD AUTO: 4.76 10*6/MM3 (ref 4.14–5.8)
SODIUM SERPL-SCNC: 138 MMOL/L (ref 136–145)
T4 FREE SERPL-MCNC: 1.35 NG/DL (ref 0.93–1.7)
TSH SERPL DL<=0.005 MIU/L-ACNC: 2.38 UIU/ML (ref 0.27–4.2)
WBC # BLD AUTO: 6.41 10*3/MM3 (ref 3.4–10.8)

## 2020-04-06 RX ORDER — FLUTICASONE PROPIONATE 50 MCG
SPRAY, SUSPENSION (ML) NASAL
Qty: 1 BOTTLE | Refills: 4 | Status: SHIPPED | OUTPATIENT
Start: 2020-04-06 | End: 2022-04-14 | Stop reason: SDUPTHER

## 2020-04-06 RX ORDER — MONTELUKAST SODIUM 10 MG/1
TABLET ORAL
Qty: 90 TABLET | Refills: 2 | Status: SHIPPED | OUTPATIENT
Start: 2020-04-06 | End: 2021-03-03

## 2020-04-06 RX ORDER — LEVOCETIRIZINE DIHYDROCHLORIDE 5 MG/1
TABLET, FILM COATED ORAL
Qty: 90 TABLET | Refills: 0 | Status: SHIPPED | OUTPATIENT
Start: 2020-04-06 | End: 2020-07-15

## 2020-07-15 DIAGNOSIS — F41.9 ANXIETY: ICD-10-CM

## 2020-07-15 RX ORDER — BUSPIRONE HYDROCHLORIDE 10 MG/1
TABLET ORAL
Qty: 90 TABLET | Refills: 0 | Status: SHIPPED | OUTPATIENT
Start: 2020-07-15 | End: 2020-09-09

## 2020-07-15 RX ORDER — LEVOCETIRIZINE DIHYDROCHLORIDE 5 MG/1
TABLET, FILM COATED ORAL
Qty: 90 TABLET | Refills: 1 | Status: SHIPPED | OUTPATIENT
Start: 2020-07-15 | End: 2021-03-03

## 2020-09-08 DIAGNOSIS — F41.9 ANXIETY: ICD-10-CM

## 2020-09-09 DIAGNOSIS — F41.9 ANXIETY: ICD-10-CM

## 2020-09-09 RX ORDER — BUSPIRONE HYDROCHLORIDE 10 MG/1
TABLET ORAL
Qty: 90 TABLET | Refills: 0 | Status: SHIPPED | OUTPATIENT
Start: 2020-09-09 | End: 2020-09-09

## 2020-09-09 RX ORDER — BUSPIRONE HYDROCHLORIDE 10 MG/1
TABLET ORAL
Qty: 90 TABLET | Refills: 0 | Status: SHIPPED | OUTPATIENT
Start: 2020-09-09 | End: 2020-12-12

## 2020-10-02 RX ORDER — CARVEDILOL 12.5 MG/1
12.5 TABLET ORAL 2 TIMES DAILY WITH MEALS
Qty: 60 TABLET | Refills: 5 | Status: SHIPPED | OUTPATIENT
Start: 2020-10-02 | End: 2021-06-15

## 2020-10-19 ENCOUNTER — OFFICE VISIT (OUTPATIENT)
Dept: INTERNAL MEDICINE | Facility: CLINIC | Age: 45
End: 2020-10-19

## 2020-10-19 VITALS
HEART RATE: 70 BPM | DIASTOLIC BLOOD PRESSURE: 80 MMHG | BODY MASS INDEX: 31.02 KG/M2 | TEMPERATURE: 97.7 F | OXYGEN SATURATION: 98 % | SYSTOLIC BLOOD PRESSURE: 140 MMHG | WEIGHT: 229 LBS | HEIGHT: 72 IN

## 2020-10-19 DIAGNOSIS — I10 ESSENTIAL HYPERTENSION: Primary | ICD-10-CM

## 2020-10-19 DIAGNOSIS — E29.1 HYPOGONADISM IN MALE: ICD-10-CM

## 2020-10-19 DIAGNOSIS — F41.9 ANXIETY: ICD-10-CM

## 2020-10-19 PROCEDURE — 99214 OFFICE O/P EST MOD 30 MIN: CPT | Performed by: INTERNAL MEDICINE

## 2020-10-19 RX ORDER — OMEPRAZOLE 40 MG/1
40 CAPSULE, DELAYED RELEASE ORAL DAILY
Qty: 90 CAPSULE | Refills: 3 | Status: SHIPPED | OUTPATIENT
Start: 2020-10-19 | End: 2021-10-11

## 2020-10-19 NOTE — PROGRESS NOTES
"Subjective  Jan Vyas is a 45 y.o. 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  History of intermittent alcohol binging in the past has been off alcohol and testosterone now for a few weeks    The following portions of the patient's history were reviewed and updated as appropriate: allergies, current medications, past family history, past medical history, past social history, past surgical history, and problem list.     Review of Systems   Constitutional: Negative.  Negative for activity change, appetite change, fatigue and fever.   HENT: Negative for congestion, ear discharge, ear pain and trouble swallowing.    Eyes: Negative for photophobia and visual disturbance.   Respiratory: Negative for cough and shortness of breath.    Cardiovascular: Negative for chest pain and palpitations.   Gastrointestinal: Negative for abdominal distention, abdominal pain, constipation, diarrhea, nausea and vomiting.   Endocrine: Negative.    Genitourinary: Negative for dysuria, hematuria and urgency.   Musculoskeletal: Positive for arthralgias. Negative for back pain, joint swelling and myalgias.   Skin: Negative for color change and rash.   Allergic/Immunologic: Negative.    Neurological: Negative for dizziness, weakness, light-headedness and headaches.   Hematological: Negative for adenopathy. Does not bruise/bleed easily.   Psychiatric/Behavioral: Positive for sleep disturbance. Negative for agitation, confusion and dysphoric mood. The patient is nervous/anxious.        Visit Vitals  /80   Pulse 70   Temp 97.7 °F (36.5 °C) (Temporal)   Ht 182.9 cm (72.01\")   Wt 104 kg (229 lb)   SpO2 98%   BMI 31.05 kg/m²       Objective  Physical Exam  Constitutional:       General: He is not in acute distress.     Appearance: He is " well-developed.   HENT:      Nose: Nose normal.   Eyes:      General: No scleral icterus.     Conjunctiva/sclera: Conjunctivae normal.   Neck:      Thyroid: No thyromegaly.      Trachea: No tracheal deviation.   Cardiovascular:      Rate and Rhythm: Normal rate and regular rhythm.      Heart sounds: No murmur. No friction rub.   Pulmonary:      Effort: No respiratory distress.      Breath sounds: No wheezing or rales.   Abdominal:      General: There is no distension.      Palpations: Abdomen is soft. There is no mass.      Tenderness: There is no abdominal tenderness. There is no guarding.   Musculoskeletal: Normal range of motion.         General: No deformity.   Lymphadenopathy:      Cervical: No cervical adenopathy.   Skin:     General: Skin is warm and dry.      Findings: No erythema or rash.   Neurological:      Mental Status: He is alert and oriented to person, place, and time.      Cranial Nerves: No cranial nerve deficit.      Coordination: Coordination normal.      Deep Tendon Reflexes: Reflexes are normal and symmetric.   Psychiatric:         Behavior: Behavior normal.         Thought Content: Thought content normal.         Judgment: Judgment normal.         Diagnoses and all orders for this visit:    Essential hypertension counseled about alcohol avoidance continue with current meds and low-salt diet    Hypogonadism in male currently off testosterone can check a level on him in the future in the meantime continue with his routine exercise program denies fatigue or loss of libido    Anxiety stable counseled about sleep hygiene continue with Lexapro along with BuSpar

## 2020-11-17 ENCOUNTER — OFFICE VISIT (OUTPATIENT)
Dept: BEHAVIORAL HEALTH | Facility: CLINIC | Age: 45
End: 2020-11-17

## 2020-11-17 VITALS
DIASTOLIC BLOOD PRESSURE: 90 MMHG | HEART RATE: 81 BPM | SYSTOLIC BLOOD PRESSURE: 142 MMHG | HEIGHT: 72 IN | BODY MASS INDEX: 30.2 KG/M2 | TEMPERATURE: 97.8 F | OXYGEN SATURATION: 95 % | WEIGHT: 223 LBS

## 2020-11-17 DIAGNOSIS — F41.9 ANXIETY: Primary | ICD-10-CM

## 2020-11-17 PROCEDURE — 90792 PSYCH DIAG EVAL W/MED SRVCS: CPT | Performed by: NURSE PRACTITIONER

## 2020-11-17 NOTE — PROGRESS NOTES
Patient Name: Jan Vyas  MRN: [id number]   :  1975     Referring Physician: Lázaro Hadley MD    Chief Complaint:     ICD-10-CM ICD-9-CM   1. Anxiety  F41.9 300.00       HPI:   Jan Vyas is a 45 y.o. male who is here today for initial evaluation of Anxiety .  Patient states he has a degree in theology.  He is very interested in sociology and theory.  Patient is  with 3 kids.  Patient states he and his ex-wife are good friends and coparent well.  Patient states he does have a significant other.  This bothers his mother who he considers his confidant as she is very religiously based.  Father works on their farm.  Patient states that he quite often has a hard time relaxing and at times will binge drink.  Also has times where he takes testosterone on his own at the gym.  Then he has other times where he does not drink at all.  Does feel like he is driven like a motor.  Difficult to relax at times.  Likes reading and watching jeopardy.  Not sure why he does not feel content.  Lexapro has been on since approximately .  He used to be on 5 mg but increase to 10 mg.  States he does not take trazodone often.  States he takes buspirone 10 mg at least twice a day on occasion sometimes 3 times a day.  Patient is not sure what the next step should be if that is changing medication or participating in therapy.  Patient is a principal at a school.  Patient states he is very easily swayed when people suggest to him for example to apply for a job for teaching he did and later when people suggested he apply for principal position he did that as well.    Past Medical History:   Past Medical History:   Diagnosis Date   • Allergic    • HTN (hypertension)    • Hypogonadism male        Past Surgical History:   Past Surgical History:   Procedure Laterality Date   • FOOT FRACTURE SURGERY         Social History:   Social History     Socioeconomic History   • Marital status:      Spouse name:  Not on file   • Number of children: Not on file   • Years of education: Not on file   • Highest education level: Not on file   Tobacco Use   • Smoking status: Never Smoker   • Smokeless tobacco: Current User     Types: Snuff   Substance and Sexual Activity   • Alcohol use: No       Family History:  History reviewed. No pertinent family history.    Allergy:  No Known Allergies    Current Medications:   Current Outpatient Medications   Medication Sig Dispense Refill   • busPIRone (BUSPAR) 10 MG tablet TAKE ONE TABLET BY MOUTH THREE TIMES A DAY 90 tablet 0   • carvedilol (COREG) 12.5 MG tablet Take 1 tablet by mouth 2 (Two) Times a Day With Meals. 60 tablet 5   • escitalopram (LEXAPRO) 10 MG tablet Take 1 tablet by mouth Daily. 90 tablet 3   • fluticasone (FLONASE) 50 MCG/ACT nasal spray SPRAY TWO SPRAYS IN EACH NOSTRIL ONCE DAILY 1 bottle 4   • ibuprofen (ADVIL,MOTRIN) 800 MG tablet Take  by mouth.     • levocetirizine (XYZAL) 5 MG tablet TAKE ONE TABLET BY MOUTH EVERY EVENING 90 tablet 1   • losartan-hydrochlorothiazide (HYZAAR) 100-25 MG per tablet Take 1 tablet by mouth Daily. 90 tablet 3   • montelukast (SINGULAIR) 10 MG tablet TAKE ONE TABLET BY MOUTH DAILY 90 tablet 2   • omeprazole (priLOSEC) 40 MG capsule Take 1 capsule by mouth Daily. 90 capsule 3   • rizatriptan (MAXALT) 10 MG tablet Take 1 tablet by mouth 1 (One) Time As Needed for Migraine for up to 1 dose. May repeat in 2 hours if needed 9 tablet 5   • traZODone (DESYREL) 50 MG tablet Take 1 tablet by mouth Every Night. 30 tablet 5   • albuterol sulfate  (90 Base) MCG/ACT inhaler Every 4 (Four) Hours.       No current facility-administered medications for this visit.        Lab Results:   No visits with results within 3 Month(s) from this visit.   Latest known visit with results is:   Office Visit on 03/24/2020   Component Date Value Ref Range Status   • WBC 03/24/2020 6.41  3.40 - 10.80 10*3/mm3 Final   • RBC 03/24/2020 4.76  4.14 - 5.80 10*6/mm3  Final   • Hemoglobin 03/24/2020 14.8  13.0 - 17.7 g/dL Final   • Hematocrit 03/24/2020 41.9  37.5 - 51.0 % Final   • MCV 03/24/2020 88.0  79.0 - 97.0 fL Final   • MCH 03/24/2020 31.1  26.6 - 33.0 pg Final   • MCHC 03/24/2020 35.3  31.5 - 35.7 g/dL Final   • RDW 03/24/2020 12.4  12.3 - 15.4 % Final   • Platelets 03/24/2020 280  140 - 450 10*3/mm3 Final   • Neutrophil Rel % 03/24/2020 61.5  42.7 - 76.0 % Final   • Lymphocyte Rel % 03/24/2020 30.9  19.6 - 45.3 % Final   • Monocyte Rel % 03/24/2020 5.9  5.0 - 12.0 % Final   • Eosinophil Rel % 03/24/2020 0.8  0.3 - 6.2 % Final   • Basophil Rel % 03/24/2020 0.6  0.0 - 1.5 % Final   • Neutrophils Absolute 03/24/2020 3.94  1.70 - 7.00 10*3/mm3 Final   • Lymphocytes Absolute 03/24/2020 1.98  0.70 - 3.10 10*3/mm3 Final   • Monocytes Absolute 03/24/2020 0.38  0.10 - 0.90 10*3/mm3 Final   • Eosinophils Absolute 03/24/2020 0.05  0.00 - 0.40 10*3/mm3 Final   • Basophils Absolute 03/24/2020 0.04  0.00 - 0.20 10*3/mm3 Final   • Immature Granulocyte Rel % 03/24/2020 0.3  0.0 - 0.5 % Final   • Immature Grans Absolute 03/24/2020 0.02  0.00 - 0.05 10*3/mm3 Final   • nRBC 03/24/2020 0.0  0.0 - 0.2 /100 WBC Final   • Glucose 03/24/2020 102* 65 - 99 mg/dL Final   • BUN 03/24/2020 19  6 - 20 mg/dL Final   • Creatinine 03/24/2020 1.15  0.76 - 1.27 mg/dL Final   • eGFR Non  Am 03/24/2020 69  >60 mL/min/1.73 Final   • eGFR African Am 03/24/2020 84  >60 mL/min/1.73 Final   • BUN/Creatinine Ratio 03/24/2020 16.5  7.0 - 25.0 Final   • Sodium 03/24/2020 138  136 - 145 mmol/L Final   • Potassium 03/24/2020 4.1  3.5 - 5.2 mmol/L Final   • Chloride 03/24/2020 96* 98 - 107 mmol/L Final   • Total CO2 03/24/2020 28.6  22.0 - 29.0 mmol/L Final   • Calcium 03/24/2020 10.1  8.6 - 10.5 mg/dL Final   • Total Protein 03/24/2020 7.8  6.0 - 8.5 g/dL Final   • Albumin 03/24/2020 4.90  3.50 - 5.20 g/dL Final   • Globulin 03/24/2020 2.9  gm/dL Final   • A/G Ratio 03/24/2020 1.7  g/dL Final   • Total  Bilirubin 03/24/2020 0.8  0.2 - 1.2 mg/dL Final   • Alkaline Phosphatase 03/24/2020 63  39 - 117 U/L Final   • AST (SGOT) 03/24/2020 33  1 - 40 U/L Final   • ALT (SGPT) 03/24/2020 50* 1 - 41 U/L Final   • Free T4 03/24/2020 1.35  0.93 - 1.70 ng/dL Final    Results may be falsely increased if patient taking Biotin.   • TSH 03/24/2020 2.380  0.270 - 4.200 uIU/mL Final       Review of Symptoms:   Review of Systems   Constitutional: Negative for activity change, appetite change, fatigue, unexpected weight gain and unexpected weight loss.   Respiratory: Negative for shortness of breath and wheezing.    Gastrointestinal: Negative for constipation, diarrhea, nausea and vomiting.   Musculoskeletal: Negative for gait problem.   Skin: Negative for dry skin and rash.   Neurological: Negative for dizziness, speech difficulty, weakness, light-headedness, headache, memory problem and confusion.   Psychiatric/Behavioral: Positive for stress. Negative for agitation, behavioral problems, decreased concentration, dysphoric mood, hallucinations, self-injury, sleep disturbance, suicidal ideas, negative for hyperactivity and depressed mood. The patient is nervous/anxious.        Physical Exam:   Physical Exam  Vitals signs and nursing note reviewed.   Constitutional:       General: He is not in acute distress.     Appearance: He is well-developed. He is not diaphoretic.   HENT:      Head: Normocephalic and atraumatic.   Eyes:      Conjunctiva/sclera: Conjunctivae normal.   Neck:      Musculoskeletal: Full passive range of motion without pain and normal range of motion.   Cardiovascular:      Rate and Rhythm: Normal rate.   Pulmonary:      Effort: Pulmonary effort is normal. No respiratory distress.   Musculoskeletal: Normal range of motion.   Skin:     General: Skin is warm and dry.   Neurological:      Mental Status: He is alert and oriented to person, place, and time.   Psychiatric:         Mood and Affect: Mood is anxious. Mood is  "not depressed. Affect is not labile, blunt, angry or inappropriate.         Speech: Speech is not rapid and pressured or tangential.         Behavior: Behavior normal. Behavior is not agitated, slowed, aggressive, withdrawn, hyperactive or combative. Behavior is cooperative.         Thought Content: Thought content normal. Thought content is not paranoid or delusional. Thought content does not include homicidal or suicidal ideation. Thought content does not include homicidal or suicidal plan.         Judgment: Judgment normal.       Blood pressure 142/90, pulse 81, temperature 97.8 °F (36.6 °C), height 182.9 cm (72.01\"), weight 101 kg (223 lb), SpO2 95 %.  Body mass index is 30.24 kg/m².     Mental Status Exam:   Appearance: appropriate  Hygiene:   good  Cooperation:  Cooperative  Eye Contact:  Good  Psychomotor Behavior:  Restless  Mood:anxious  Affect:  Appropriate  Hopelessness: Denies  Speech:  Rapid  Thought Process:  Goal directed  Thought Content:  Normal  Suicidal:  None  Homicidal:  None  Hallucinations:  None  Delusion:  None  Memory:  Intact  Orientation:  Person, Place, Time and Situation  Reliability:  good  Insight:  Good  Judgement:  Good  Impulse Control:  Good  Physical/Medical Issues:  No     PHQ-9 Depression Screening  Little interest or pleasure in doing things? 1   Feeling down, depressed, or hopeless? 0   Trouble falling or staying asleep, or sleeping too much? 0   Feeling tired or having little energy? 1   Poor appetite or overeating? 0   Feeling bad about yourself - or that you are a failure or have let yourself or your family down? 0   Trouble concentrating on things, such as reading the newspaper or watching television? 0   Moving or speaking so slowly that other people could have noticed? Or the opposite - being so fidgety or restless that you have been moving around a lot more than usual? 0   Thoughts that you would be better off dead, or of hurting yourself in some way? 0   PHQ-9 Total " Score 2   If you checked off any problems, how difficult have these problems made it for you to do your work, take care of things at home, or get along with other people?        Assessment/Plan:   Diagnoses and all orders for this visit:    1. Anxiety (Primary)  -     Ambulatory Referral to Behavioral Health    Continue medication as ordered refer for therapy.  We will wait till therapy starts to have another appointment and decide if at that point changes in medication are needed.    A psychological evaluation was conducted in order to assess past and current level of functioning. Areas assessed included, but were not limited to: perception of social support, perception of ability to face and deal with challenges in life (positive functioning), anxiety symptoms, depressive symptoms, perspective on beliefs/belief system, coping skills for stress, intelligence level,  Therapeutic rapport was established. Interventions conducted today were geared towards incorporating medication management along with support for continued therapy. Education was also provided as to the med management with this provider and what to expect in subsequent sessions.    We discussed risks, benefits,goals and side effects of the above medication and the patient was agreeable with the plan.Patient was educated on the importance of compliance with treatment and follow-up appointments. Patient is aware to contact the [facility] with any worsening of symptoms. To call for questions or concerns and return early if necessary. Patent is agreeable to go to the Emergency Department or call 911 should they begin SI/HI.     Treatment Plan:   Discussed risks, benefits, and alternatives of medication. Encouraged healthy habits (eating, exercise and sleep). Call if any questions or problems arise. Medication reconciled. Controlled substance monitoring report reviewed. Provided psychoeducation.. Discussed coping strategies and current stressors. Set  appropriate boundaries and limits for patient's well-being. Use distraction techniques to improve symptoms. Access support networks.      Return in about 2 months (around 1/17/2021) for Follow Up 30 min.    Rosana Ayers, APRN

## 2020-11-30 ENCOUNTER — OFFICE VISIT (OUTPATIENT)
Dept: INTERNAL MEDICINE | Facility: CLINIC | Age: 45
End: 2020-11-30

## 2020-11-30 VITALS
HEART RATE: 82 BPM | DIASTOLIC BLOOD PRESSURE: 90 MMHG | TEMPERATURE: 96.9 F | WEIGHT: 222 LBS | SYSTOLIC BLOOD PRESSURE: 130 MMHG | OXYGEN SATURATION: 99 % | BODY MASS INDEX: 30.07 KG/M2 | HEIGHT: 72 IN

## 2020-11-30 DIAGNOSIS — E29.1 HYPOGONADISM IN MALE: ICD-10-CM

## 2020-11-30 DIAGNOSIS — I10 ESSENTIAL HYPERTENSION: Primary | ICD-10-CM

## 2020-11-30 DIAGNOSIS — F41.9 ANXIETY: ICD-10-CM

## 2020-11-30 PROCEDURE — 99214 OFFICE O/P EST MOD 30 MIN: CPT | Performed by: INTERNAL MEDICINE

## 2020-11-30 NOTE — PROGRESS NOTES
"Subjective  Jan Vyas is a 45 y.o. male    HPI coming in for follow-up patient with a history of hypertension with significant anxiety and sleep disturbances has had occasional binge drinking.  Complains of significant stress with his relationship with his parents.  He used to exercise regularly at the gym however has now stopped doing this in view of the Covid situation denies recent weight gain    The following portions of the patient's history were reviewed and updated as appropriate: allergies, current medications, past family history, past medical history, past social history, past surgical history, and problem list.     Review of Systems   Constitutional: Negative.  Negative for activity change, appetite change, fatigue and fever.   HENT: Positive for congestion. Negative for ear discharge, ear pain and trouble swallowing.    Eyes: Negative for photophobia and visual disturbance.   Respiratory: Negative for cough and shortness of breath.    Cardiovascular: Negative for chest pain and palpitations.   Gastrointestinal: Negative for abdominal distention, abdominal pain, constipation, diarrhea, nausea and vomiting.   Endocrine: Negative.    Genitourinary: Negative for dysuria, hematuria and urgency.   Musculoskeletal: Negative for arthralgias, back pain, joint swelling and myalgias.   Skin: Negative for color change and rash.   Allergic/Immunologic: Negative.    Neurological: Negative for dizziness, weakness, light-headedness and headaches.   Hematological: Negative for adenopathy. Does not bruise/bleed easily.   Psychiatric/Behavioral: Positive for sleep disturbance. Negative for agitation, confusion and dysphoric mood. The patient is not nervous/anxious.        Visit Vitals  /90   Pulse 82   Temp 96.9 °F (36.1 °C) (Temporal)   Ht 182.9 cm (72.01\")   Wt 101 kg (222 lb)   SpO2 99%   BMI 30.10 kg/m²       Objective  Physical Exam  Constitutional:       General: He is not in acute distress.     " Appearance: He is well-developed.   HENT:      Nose: Nose normal.   Eyes:      General: No scleral icterus.     Conjunctiva/sclera: Conjunctivae normal.   Neck:      Thyroid: No thyromegaly.      Trachea: No tracheal deviation.   Cardiovascular:      Rate and Rhythm: Normal rate and regular rhythm.      Heart sounds: No murmur. No friction rub.   Pulmonary:      Effort: No respiratory distress.      Breath sounds: No wheezing or rales.   Abdominal:      General: There is no distension.      Palpations: Abdomen is soft. There is no mass.      Tenderness: There is no abdominal tenderness. There is no guarding.   Musculoskeletal: Normal range of motion.         General: No deformity.   Lymphadenopathy:      Cervical: No cervical adenopathy.   Skin:     General: Skin is warm and dry.      Findings: No erythema or rash.   Neurological:      Mental Status: He is alert and oriented to person, place, and time.      Cranial Nerves: No cranial nerve deficit.      Coordination: Coordination normal.      Deep Tendon Reflexes: Reflexes are normal and symmetric.   Psychiatric:         Behavior: Behavior normal.         Thought Content: Thought content normal.         Judgment: Judgment normal.         Diagnoses and all orders for this visit:    Essential hypertension stable with current meds and low-salt diet    Anxiety counseled in detail he will be seen counselor also denies symptoms suggestive of depression.  Will slowly wean him off Lexapro continue BuSpar as needed    Hypogonadism in male with a history of getting testosterone of the street now he is off it.  Continue to follow denies decreased libido or fatigue

## 2020-12-01 ENCOUNTER — OFFICE VISIT (OUTPATIENT)
Dept: BEHAVIORAL HEALTH | Facility: CLINIC | Age: 45
End: 2020-12-01

## 2020-12-01 VITALS
OXYGEN SATURATION: 95 % | HEART RATE: 76 BPM | TEMPERATURE: 96.2 F | WEIGHT: 221 LBS | HEIGHT: 70 IN | BODY MASS INDEX: 31.64 KG/M2 | DIASTOLIC BLOOD PRESSURE: 82 MMHG | SYSTOLIC BLOOD PRESSURE: 124 MMHG

## 2020-12-01 DIAGNOSIS — F10.10 ALCOHOL ABUSE: Primary | ICD-10-CM

## 2020-12-01 DIAGNOSIS — F32.0 CURRENT MILD EPISODE OF MAJOR DEPRESSIVE DISORDER WITHOUT PRIOR EPISODE (HCC): ICD-10-CM

## 2020-12-01 PROCEDURE — 90791 PSYCH DIAGNOSTIC EVALUATION: CPT | Performed by: COUNSELOR

## 2020-12-01 NOTE — PROGRESS NOTES
"     Initial Therapy Office Visit      Date: 2020     Patient Name: Jan Vyas  : 1975   Time In: 9:45  Time Out: 10:37    Chief Complaint:    Chief Complaint   Patient presents with   • Establish Care   • Anxiety       History of Present Illness: Jan Vyas is a 45 y.o. male who presents today for initial therapy. Once the therapist met with the patient in the office, he was unsure as to why he was being seen, but did state that he has been going through some life changes these past few years. The patient is currenly , and has 3 children. The patient currently is in a 4 year relationship in which his parents dont approve of.  Patient denies any depressive or anxious symptoms, but finally did discuss that he does have concerns about his drinking. He did state that at times that he does feel \"queezy\", has butterflies in his stomach, and has heart palpitations, and feels that his main source of his anxiety is his parents. His main concern is his drinking. His last time drinking was Saturday. The patient discussed that he will drink to get drunk, and always feels that he has some sort of justification for his drinking (ie, death of a friend, argument with parents). When he drinks, he will switch from beer one time, to wine, to vodka. The patient will drink any of those to get drunk. The patient also admits to having difficulty focusing on one thing and being consistent.      Subjective      Review of Systems:   The following portions of the patient's history were reviewed and updated as appropriate: allergies, current medications, past family history, past medical history, past social history, past surgical history and problem list.    Review of Systems   Skin: Negative for color change, rash and bruise.   Psychiatric/Behavioral: Positive for decreased concentration and depressed mood. The patient is nervous/anxious.        Past Medical History:   Past Medical History: "   Diagnosis Date   • Allergic    • HTN (hypertension)    • Hypogonadism male        Past Surgical History:   Past Surgical History:   Procedure Laterality Date   • FOOT FRACTURE SURGERY         Family History: History reviewed. No pertinent family history.    Mental Illness and/or Substance Abuse: Patient drinks until he becomes intoxicated. He feels that his drinking may by a problem.     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 Goals for the future include him understanding why he feels the need to drink and justify why he does it.    Significant Life Events:   Patient been through or witnessed a divorce? yes  Patient  his wife of 16 years.     Patient experienced a death / loss of relationship? no  None    Patient experienced a major accident or tragic events? no  None    Patient experienced any other significant life events or trauma (such as verbal, physical, sexual abuse)? no  None     Experience: No    Legal History: No  Court-ordered: No    Medications:     Current Outpatient Medications:   •  busPIRone (BUSPAR) 10 MG tablet, TAKE ONE TABLET BY MOUTH THREE TIMES A DAY, Disp: 90 tablet, Rfl: 0  •  carvedilol (COREG) 12.5 MG tablet, Take 1 tablet by mouth 2 (Two) Times a Day With Meals., Disp: 60 tablet, Rfl: 5  •  escitalopram (LEXAPRO) 10 MG tablet, Take 1 tablet by mouth Daily., Disp: 90 tablet,  "Rfl: 3  •  fluticasone (FLONASE) 50 MCG/ACT nasal spray, SPRAY TWO SPRAYS IN EACH NOSTRIL ONCE DAILY, Disp: 1 bottle, Rfl: 4  •  ibuprofen (ADVIL,MOTRIN) 800 MG tablet, Take  by mouth., Disp: , Rfl:   •  levocetirizine (XYZAL) 5 MG tablet, TAKE ONE TABLET BY MOUTH EVERY EVENING, Disp: 90 tablet, Rfl: 1  •  losartan-hydrochlorothiazide (HYZAAR) 100-25 MG per tablet, Take 1 tablet by mouth Daily., Disp: 90 tablet, Rfl: 3  •  montelukast (SINGULAIR) 10 MG tablet, TAKE ONE TABLET BY MOUTH DAILY, Disp: 90 tablet, Rfl: 2  •  omeprazole (priLOSEC) 40 MG capsule, Take 1 capsule by mouth Daily., Disp: 90 capsule, Rfl: 3  •  rizatriptan (MAXALT) 10 MG tablet, Take 1 tablet by mouth 1 (One) Time As Needed for Migraine for up to 1 dose. May repeat in 2 hours if needed, Disp: 9 tablet, Rfl: 5    Allergies:   No Known Allergies    PHQ-9 Score:   PHQ-9 Total Score:       Objective     Physical Exam:   Blood pressure 124/82, pulse 76, temperature 96.2 °F (35.7 °C), height 177.8 cm (70\"), weight 100 kg (221 lb), SpO2 95 %. Body mass index is 31.71 kg/m².     Physical Exam  Vitals signs and nursing note reviewed.   Constitutional:       General: He is awake.      Appearance: Normal appearance. He is well-developed, well-groomed and normal weight.      Interventions: Face mask in place.      Comments: Face mask due to Covid   Musculoskeletal: Normal range of motion.   Skin:     Findings: No acne.   Neurological:      General: No focal deficit present.      Mental Status: He is alert and oriented to person, place, and time.      Motor: No tremor.      Gait: Gait is intact.   Psychiatric:         Attention and Perception: Attention normal. He is attentive. He does not perceive auditory or visual hallucinations.         Mood and Affect: Mood and affect normal.         Speech: Speech normal.         Behavior: Behavior normal. Behavior is cooperative.         Thought Content: Thought content normal.         Cognition and Memory: Cognition " and memory normal.         Judgment: Judgment normal.         Patient's Support Network Includes:  parents, and girlfriend    Prognosis: Good with Ongoing Treatment     Mental Status Exam:   Hygiene:   good  Cooperation:  Cooperative  Eye Contact:  Good  Psychomotor Behavior:  Appropriate  Affect:  Appropriate  Mood: normal  Hopelessness: Denies  Speech:  Normal  Thought Process:  Goal directed  Thought Content:  Normal  Suicidal:  None  Homicidal:  None  Hallucinations:  None  Delusion:  None  Memory:  Intact  Orientation:  Person, Place, Time and Situation  Reliability:  good  Insight:  Good  Judgement:  Good  Impulse Control:  Good  Physical/Medical Issues:  No      Assessment / Plan      Assessment/Plan:   Diagnoses and all orders for this visit:    1. Alcohol abuse (Primary)    2. Current mild episode of major depressive disorder without prior episode (CMS/HCC)         1. In future sessions, we are going to work together to come up with more coping skills, and work to find out why he feels that he needs to seek the approval of his parents all the time; even though he is 41 years old.     TREATMENT PLAN/GOALS: Continue supportive psychotherapy efforts and medications as indicated. Treatment and medication options discussed during today's visit. Patient ackowledged and verbally consented to continue with current treatment plan and was educated on the importance of compliance with treatment and follow-up appointments.     Counseled patient regarding multimodal approach with healthy nutrition, healthy sleep, regular physical activity, social activities, counseling, and medications.      Coping skills reviewed and encouraged positive framing of thoughts. No suicidal ideation or homicidal ideation at this time.      Assisted patient in processing above session content; acknowledged and normalized patient’s thoughts, feelings, and concerns.  Applied  positive coping skills and behavior management in  session.    Allowed patient to freely discuss issues without interruption or judgment. Provided safe, confidential environment to facilitate the development of positive therapeutic relationship and encourage open, honest communication. Assisted patient in identifying risk factors which would indicate the need for higher level of care including thoughts to harm self or others and/or self-harming behavior and encouraged patient to contact this office, call 911, or present to the nearest emergency room should any of these events occur. Discussed crisis intervention services and means to access.     Follow Up:   Return in about 4 weeks (around 12/29/2020) for Recheck.    ALENA Monson  This document has been electronically signed by ALENA Monson  December 1, 2020 14:11 EST    Please note that portions of this note were completed with a voice recognition program. Efforts were made to edit dictation, but occasionally words are mistranscribed.

## 2020-12-12 DIAGNOSIS — F41.9 ANXIETY: ICD-10-CM

## 2020-12-14 RX ORDER — BUSPIRONE HYDROCHLORIDE 10 MG/1
TABLET ORAL
Qty: 90 TABLET | Refills: 3 | Status: SHIPPED | OUTPATIENT
Start: 2020-12-14 | End: 2022-02-28

## 2021-01-18 RX ORDER — LOSARTAN POTASSIUM AND HYDROCHLOROTHIAZIDE 25; 100 MG/1; MG/1
TABLET ORAL
Qty: 90 TABLET | Refills: 3 | Status: SHIPPED | OUTPATIENT
Start: 2021-01-18 | End: 2022-01-19

## 2021-01-25 ENCOUNTER — PRIOR AUTHORIZATION (OUTPATIENT)
Dept: INTERNAL MEDICINE | Facility: CLINIC | Age: 46
End: 2021-01-25

## 2021-02-02 ENCOUNTER — OFFICE VISIT (OUTPATIENT)
Dept: INTERNAL MEDICINE | Facility: CLINIC | Age: 46
End: 2021-02-02

## 2021-02-02 VITALS
SYSTOLIC BLOOD PRESSURE: 120 MMHG | WEIGHT: 220 LBS | BODY MASS INDEX: 29.8 KG/M2 | HEIGHT: 72 IN | HEART RATE: 98 BPM | OXYGEN SATURATION: 99 % | DIASTOLIC BLOOD PRESSURE: 86 MMHG | TEMPERATURE: 97.1 F

## 2021-02-02 DIAGNOSIS — R42 DIZZINESS: ICD-10-CM

## 2021-02-02 DIAGNOSIS — G43.109 OCULAR MIGRAINE: Primary | ICD-10-CM

## 2021-02-02 DIAGNOSIS — I10 ESSENTIAL HYPERTENSION: ICD-10-CM

## 2021-02-02 PROCEDURE — 99214 OFFICE O/P EST MOD 30 MIN: CPT | Performed by: FAMILY MEDICINE

## 2021-02-02 RX ORDER — SUMATRIPTAN 50 MG/1
TABLET, FILM COATED ORAL
Qty: 9 TABLET | Refills: 2 | Status: SHIPPED | OUTPATIENT
Start: 2021-02-02 | End: 2022-04-11

## 2021-02-02 NOTE — ASSESSMENT & PLAN NOTE
May be secondary to orthostatic hypotension off and on.  Encouraged to check BP at home.  No changes to BP medication today as BP is perfect in office. Discussed possible hypoglycemia as well if he is not taking in even protein and not enough carbs to sustain him through a workout.  Advised low carb is a good diet, but does need to have some healthy carbs in his diet.

## 2021-02-02 NOTE — ASSESSMENT & PLAN NOTE
Will change maxalt to imitrex.  Advised on how to take medication.  I do feel that this is a true migraine.  Reassured patient of this.

## 2021-02-02 NOTE — ASSESSMENT & PLAN NOTE
BP is stable in office today.  Discussed may be dropping at home if he feels dizzy or lightheaded on standing.  Encouraged to start checking BP at home and keep a log of it.

## 2021-02-02 NOTE — PROGRESS NOTES
Jan Vyas is a 45 y.o. male.    Chief Complaint   Patient presents with   • Headache   • Loss of Vision       HPI   Patient reports visual aura earlier today with headache.  He reports he gets migraines a couple of time a year and takes maxalt with good response.  He took maxalt earlier today and symptoms resolved.  However, a few hours later after exercising at the gym he reports loss of peripheral vision and visual aura.  He currently has a pounding headache over his left eye. Vision has returned fully.  He believes sleep deprivation triggers his migraines, but cannot pin point any other triggers.  He did have trouble sleeping over the weekend, but had good sleep the past couple of nights.  He has never had more than 1 migraine in a day.     He does report occasionally feeling light headed on standing, particularly after a workout.  He has changed his diet and reports weight loss over the last month or two.  However, he is only down 1lb per our office readings.  He is eating lean proteins and mostly vegetables.  Avoiding carbs.      The following portions of the patient's history were reviewed and updated as appropriate: allergies, current medications, past family history, past medical history, past social history, past surgical history and problem list.     No Known Allergies      Current Outpatient Medications:   •  busPIRone (BUSPAR) 10 MG tablet, TAKE ONE TABLET BY MOUTH THREE TIMES A DAY, Disp: 90 tablet, Rfl: 3  •  carvedilol (COREG) 12.5 MG tablet, Take 1 tablet by mouth 2 (Two) Times a Day With Meals., Disp: 60 tablet, Rfl: 5  •  fluticasone (FLONASE) 50 MCG/ACT nasal spray, SPRAY TWO SPRAYS IN EACH NOSTRIL ONCE DAILY, Disp: 1 bottle, Rfl: 4  •  ibuprofen (ADVIL,MOTRIN) 800 MG tablet, Take  by mouth., Disp: , Rfl:   •  levocetirizine (XYZAL) 5 MG tablet, TAKE ONE TABLET BY MOUTH EVERY EVENING, Disp: 90 tablet, Rfl: 1  •  losartan-hydrochlorothiazide (HYZAAR) 100-25 MG per tablet, TAKE ONE TABLET  "BY MOUTH DAILY, Disp: 90 tablet, Rfl: 3  •  montelukast (SINGULAIR) 10 MG tablet, TAKE ONE TABLET BY MOUTH DAILY, Disp: 90 tablet, Rfl: 2  •  omeprazole (priLOSEC) 40 MG capsule, Take 1 capsule by mouth Daily., Disp: 90 capsule, Rfl: 3  •  SUMAtriptan (Imitrex) 50 MG tablet, Take one tablet at onset of headache. May repeat dose one time in 2 hours if headache not relieved., Disp: 9 tablet, Rfl: 2    ROS    Review of Systems   Constitutional: Negative for chills and fever.   Eyes: Positive for visual disturbance.   Respiratory: Negative for cough and shortness of breath.    Neurological: Positive for dizziness, light-headedness and headache.       Vitals:    02/02/21 1626   BP: 120/86   BP Location: Left arm   Patient Position: Sitting   Cuff Size: Adult   Pulse: 98   Temp: 97.1 °F (36.2 °C)   TempSrc: Temporal   SpO2: 99%   Weight: 99.8 kg (220 lb)   Height: 182.9 cm (72.01\")     Body mass index is 29.83 kg/m².    Physical Exam     Physical Exam  Constitutional:       General: He is not in acute distress.     Appearance: Normal appearance. He is well-developed.   HENT:      Head: Normocephalic and atraumatic.      Right Ear: External ear normal.      Left Ear: External ear normal.   Eyes:      Extraocular Movements: Extraocular movements intact.      Conjunctiva/sclera: Conjunctivae normal.      Pupils: Pupils are equal, round, and reactive to light.   Cardiovascular:      Rate and Rhythm: Normal rate and regular rhythm.      Heart sounds: No murmur.   Pulmonary:      Effort: Pulmonary effort is normal. No respiratory distress.      Breath sounds: Normal breath sounds. No wheezing.   Abdominal:      General: Bowel sounds are normal. There is no distension.      Palpations: Abdomen is soft.      Tenderness: There is no abdominal tenderness.   Skin:     General: Skin is warm and dry.   Neurological:      Mental Status: He is alert and oriented to person, place, and time.      Cranial Nerves: No cranial nerve deficit. "   Psychiatric:         Mood and Affect: Mood is anxious.         Assessment/Plan    Problems Addressed this Visit        Cardiac and Vasculature    Essential hypertension     BP is stable in office today.  Discussed may be dropping at home if he feels dizzy or lightheaded on standing.  Encouraged to start checking BP at home and keep a log of it.              Neuro    Ocular migraine - Primary     Will change maxalt to imitrex.  Advised on how to take medication.  I do feel that this is a true migraine.  Reassured patient of this.             Relevant Medications    SUMAtriptan (Imitrex) 50 MG tablet       Symptoms and Signs    Dizziness     May be secondary to orthostatic hypotension off and on.  Encouraged to check BP at home.  No changes to BP medication today as BP is perfect in office. Discussed possible hypoglycemia as well if he is not taking in even protein and not enough carbs to sustain him through a workout.  Advised low carb is a good diet, but does need to have some healthy carbs in his diet.               New Medications Ordered This Visit   Medications   • SUMAtriptan (Imitrex) 50 MG tablet     Sig: Take one tablet at onset of headache. May repeat dose one time in 2 hours if headache not relieved.     Dispense:  9 tablet     Refill:  2       No orders of the defined types were placed in this encounter.      No follow-ups on file.    Claudia Warner,

## 2021-03-04 RX ORDER — MONTELUKAST SODIUM 10 MG/1
TABLET ORAL
Qty: 90 TABLET | Refills: 3 | Status: SHIPPED | OUTPATIENT
Start: 2021-03-04 | End: 2022-03-14

## 2021-03-04 RX ORDER — LEVOCETIRIZINE DIHYDROCHLORIDE 5 MG/1
TABLET, FILM COATED ORAL
Qty: 90 TABLET | Refills: 3 | Status: SHIPPED | OUTPATIENT
Start: 2021-03-04 | End: 2022-03-14

## 2021-05-30 ENCOUNTER — RECORDS - HEALTHEAST (OUTPATIENT)
Dept: ADMINISTRATIVE | Facility: CLINIC | Age: 46
End: 2021-05-30

## 2021-06-15 RX ORDER — CARVEDILOL 12.5 MG/1
TABLET ORAL
Qty: 180 TABLET | Refills: 4 | Status: SHIPPED | OUTPATIENT
Start: 2021-06-15 | End: 2022-10-13

## 2021-09-03 ENCOUNTER — TRANSCRIBE ORDERS (OUTPATIENT)
Dept: LAB | Facility: HOSPITAL | Age: 46
End: 2021-09-03

## 2021-09-03 ENCOUNTER — LAB (OUTPATIENT)
Dept: LAB | Facility: HOSPITAL | Age: 46
End: 2021-09-03

## 2021-09-03 DIAGNOSIS — Z20.822 COVID-19 RULED OUT: ICD-10-CM

## 2021-09-03 DIAGNOSIS — Z20.822 COVID-19 RULED OUT: Primary | ICD-10-CM

## 2021-09-03 LAB — SARS-COV-2 RNA NOSE QL NAA+PROBE: NOT DETECTED

## 2021-09-03 PROCEDURE — U0004 COV-19 TEST NON-CDC HGH THRU: HCPCS

## 2021-09-03 PROCEDURE — C9803 HOPD COVID-19 SPEC COLLECT: HCPCS

## 2021-10-08 ENCOUNTER — OFFICE VISIT (OUTPATIENT)
Dept: INTERNAL MEDICINE | Facility: CLINIC | Age: 46
End: 2021-10-08

## 2021-10-08 VITALS
WEIGHT: 215.4 LBS | DIASTOLIC BLOOD PRESSURE: 78 MMHG | HEIGHT: 72 IN | HEART RATE: 77 BPM | BODY MASS INDEX: 29.17 KG/M2 | OXYGEN SATURATION: 96 % | TEMPERATURE: 97.5 F | SYSTOLIC BLOOD PRESSURE: 120 MMHG

## 2021-10-08 DIAGNOSIS — H92.09 EAR ACHE: Primary | ICD-10-CM

## 2021-10-08 PROCEDURE — 99213 OFFICE O/P EST LOW 20 MIN: CPT | Performed by: INTERNAL MEDICINE

## 2021-10-08 PROCEDURE — 0001A COVID-19 (PFIZER): CPT | Performed by: INTERNAL MEDICINE

## 2021-10-08 PROCEDURE — 91300 COVID-19 (PFIZER): CPT | Performed by: INTERNAL MEDICINE

## 2021-10-08 NOTE — PROGRESS NOTES
"Subjective  Jan Vyas is a 46 y.o. male    HPI coming in with a 3-day history of left-sided earache without any drainage denies facial pain fever no cough or shortness of breath.  Non-smoker is on a steroid no spray    The following portions of the patient's history were reviewed and updated as appropriate: allergies, current medications, past family history, past medical history, past social history, past surgical history, and problem list.     Review of Systems   Constitutional: Negative.  Negative for activity change, appetite change, fatigue and fever.   HENT: Negative for congestion, ear discharge, ear pain and trouble swallowing.    Eyes: Negative for photophobia and visual disturbance.   Respiratory: Negative for cough and shortness of breath.    Cardiovascular: Negative for chest pain and palpitations.   Gastrointestinal: Negative for abdominal distention, abdominal pain, constipation, diarrhea, nausea and vomiting.   Endocrine: Negative.    Genitourinary: Negative for dysuria, hematuria and urgency.   Musculoskeletal: Negative for arthralgias, back pain, joint swelling and myalgias.   Skin: Negative for color change and rash.   Allergic/Immunologic: Negative.    Neurological: Negative for dizziness, weakness, light-headedness and headaches.   Hematological: Negative for adenopathy. Does not bruise/bleed easily.   Psychiatric/Behavioral: Negative for agitation, confusion and dysphoric mood. The patient is not nervous/anxious.        Visit Vitals  /78   Pulse 77   Temp 97.5 °F (36.4 °C) (Infrared)   Ht 182.9 cm (72.01\")   Wt 97.7 kg (215 lb 6.4 oz)   SpO2 96%   BMI 29.21 kg/m²       Objective  Physical Exam  Constitutional:       General: He is not in acute distress.     Appearance: He is well-developed.   HENT:      Nose: Nose normal.   Eyes:      General: No scleral icterus.     Conjunctiva/sclera: Conjunctivae normal.   Neck:      Thyroid: No thyromegaly.      Trachea: No tracheal " deviation.   Cardiovascular:      Rate and Rhythm: Normal rate and regular rhythm.      Heart sounds: No murmur heard.   No friction rub.   Pulmonary:      Effort: No respiratory distress.      Breath sounds: No wheezing or rales.   Abdominal:      General: There is no distension.      Palpations: Abdomen is soft. There is no mass.      Tenderness: There is no abdominal tenderness. There is no guarding.   Musculoskeletal:         General: No deformity. Normal range of motion.   Lymphadenopathy:      Cervical: No cervical adenopathy.   Skin:     General: Skin is warm and dry.      Findings: No erythema or rash.   Neurological:      Mental Status: He is alert and oriented to person, place, and time.      Cranial Nerves: No cranial nerve deficit.      Coordination: Coordination normal.      Deep Tendon Reflexes: Reflexes are normal and symmetric.   Psychiatric:         Behavior: Behavior normal.         Thought Content: Thought content normal.         Judgment: Judgment normal.         Diagnoses and all orders for this visit:    Ear ache exam unremarkable.  Discussed auto insufflation.  Patient to get Covid booster today    Other orders  -     COVID-19 Vaccine ([employer])

## 2021-10-11 RX ORDER — OMEPRAZOLE 40 MG/1
CAPSULE, DELAYED RELEASE ORAL
Qty: 90 CAPSULE | Refills: 3 | Status: SHIPPED | OUTPATIENT
Start: 2021-10-11 | End: 2021-10-12 | Stop reason: SDUPTHER

## 2021-10-12 RX ORDER — OMEPRAZOLE 40 MG/1
40 CAPSULE, DELAYED RELEASE ORAL DAILY
Qty: 90 CAPSULE | Refills: 3 | Status: SHIPPED | OUTPATIENT
Start: 2021-10-12 | End: 2022-12-27

## 2022-01-07 ENCOUNTER — TELEMEDICINE (OUTPATIENT)
Dept: INTERNAL MEDICINE | Facility: CLINIC | Age: 47
End: 2022-01-07

## 2022-01-07 DIAGNOSIS — E29.1 HYPOGONADISM IN MALE: Primary | ICD-10-CM

## 2022-01-07 DIAGNOSIS — E78.2 MIXED HYPERLIPIDEMIA: ICD-10-CM

## 2022-01-07 PROCEDURE — 99213 OFFICE O/P EST LOW 20 MIN: CPT | Performed by: INTERNAL MEDICINE

## 2022-01-07 NOTE — PROGRESS NOTES
Subjective  Jan Vyas is a 46 y.o. male    HPI follow-up on hypertension and a history of anxiety disorder.  Doing well currently.  No current alcohol use.  Exercising  You have chosen to receive care through a telephone visit. Do you consent to use a telephone visit for your medical care today? Yes  The following portions of the patient's history were reviewed and updated as appropriate: allergies, current medications, past family history, past medical history, past social history, past surgical history, and problem list.     Review of Systems   Constitutional: Negative.  Negative for activity change, appetite change, fatigue and fever.   HENT: Negative for congestion, ear discharge, ear pain and trouble swallowing.    Eyes: Negative for photophobia and visual disturbance.   Respiratory: Negative for cough and shortness of breath.    Cardiovascular: Negative for chest pain and palpitations.   Gastrointestinal: Negative for abdominal distention, abdominal pain, constipation, diarrhea, nausea and vomiting.   Endocrine: Negative.    Genitourinary: Negative for dysuria, hematuria and urgency.   Musculoskeletal: Positive for arthralgias. Negative for back pain, joint swelling and myalgias.   Skin: Negative for color change and rash.   Allergic/Immunologic: Negative.    Neurological: Negative for dizziness, weakness, light-headedness and headaches.   Hematological: Negative for adenopathy. Does not bruise/bleed easily.   Psychiatric/Behavioral: Negative for agitation, confusion and dysphoric mood. The patient is not nervous/anxious.        There were no vitals taken for this visit.    Objective  Physical Exam    Diagnoses and all orders for this visit:    Hypogonadism in male he has been off testosterone for a year and a half now.  Denies fatigue or loss of libido.  Exercising regularly.  He is interested in checking his level.  -     Testosterone    Mixed hyperlipidemia continue with the dietary restrictions  check lipid profile  -     Comprehensive Metabolic Panel  -     Lipid Panel    Hypertension follow BP readings at home continue current meds which he is tolerating well

## 2022-01-19 DIAGNOSIS — I10 ESSENTIAL HYPERTENSION: Primary | ICD-10-CM

## 2022-01-19 RX ORDER — LOSARTAN POTASSIUM AND HYDROCHLOROTHIAZIDE 25; 100 MG/1; MG/1
TABLET ORAL
Qty: 90 TABLET | Refills: 3 | Status: SHIPPED | OUTPATIENT
Start: 2022-01-19 | End: 2023-01-21

## 2022-01-19 NOTE — TELEPHONE ENCOUNTER
Rx Refill Note  Requested Prescriptions     Pending Prescriptions Disp Refills   • losartan-hydrochlorothiazide (HYZAAR) 100-25 MG per tablet [Pharmacy Med Name: LOSARTAN-HCTZ 100-25 MG TAB] 90 tablet 3     Sig: TAKE ONE TABLET BY MOUTH DAILY      Last office visit with prescribing clinician: 10/8/2021      Next office visit with prescribing clinician: Visit date not found            PRUDENCIO CADE MA  01/19/22, 17:40 EST

## 2022-02-18 ENCOUNTER — TELEPHONE (OUTPATIENT)
Dept: INTERNAL MEDICINE | Facility: CLINIC | Age: 47
End: 2022-02-18

## 2022-02-18 NOTE — TELEPHONE ENCOUNTER
Caller: Jan Vyas    Relationship: Self    Best call back number: [phone]    What is the best time to reach you: ANY    Who are you requesting to speak with (clinical staff, provider,  specific staff member): CLINICAL STAFF    Do you know the name of the person who called: PATIENT    What was the call regarding: PATIENT CALLED IN AND WOULD LIKE A CALL BACK; HE WOULD LIKE DIRECTION AND CLARIFICATION ON POSSIBLY DISCONTINUATION OF ONE OF THE MEDICATIONS THAT HE IS TAKING  omeprazole (priLOSEC) 40 MG capsule; HE BELIEVES THIS IS GIVING HIM A FOGGY BRAIN AND NEED ADVISE.      Do you require a callback: PLEASE GIVE PATIENT A CALL

## 2022-02-21 NOTE — TELEPHONE ENCOUNTER
Jan says he feels like he has brain fog over the past two weeks. He wants to know if it could be the omeprazole and if so can he stop taking it? If he stops taking it can he stop all at once or does he need to slowly stop.    Patient scheduled for Friday - he will have current labs orders done on Wed, so the results will be back on Friday

## 2022-02-28 ENCOUNTER — OFFICE VISIT (OUTPATIENT)
Dept: INTERNAL MEDICINE | Facility: CLINIC | Age: 47
End: 2022-02-28

## 2022-02-28 VITALS
TEMPERATURE: 97.1 F | SYSTOLIC BLOOD PRESSURE: 138 MMHG | OXYGEN SATURATION: 97 % | WEIGHT: 209.8 LBS | HEIGHT: 72 IN | BODY MASS INDEX: 28.42 KG/M2 | HEART RATE: 60 BPM | DIASTOLIC BLOOD PRESSURE: 76 MMHG

## 2022-02-28 DIAGNOSIS — M25.512 CHRONIC LEFT SHOULDER PAIN: ICD-10-CM

## 2022-02-28 DIAGNOSIS — G89.29 CHRONIC LEFT SHOULDER PAIN: ICD-10-CM

## 2022-02-28 DIAGNOSIS — E78.5 DYSLIPIDEMIA: ICD-10-CM

## 2022-02-28 DIAGNOSIS — I10 ESSENTIAL HYPERTENSION: Primary | ICD-10-CM

## 2022-02-28 PROCEDURE — 99214 OFFICE O/P EST MOD 30 MIN: CPT | Performed by: INTERNAL MEDICINE

## 2022-02-28 RX ORDER — ESCITALOPRAM OXALATE 5 MG/1
5 TABLET ORAL DAILY
Qty: 90 TABLET | Refills: 3 | Status: SHIPPED | OUTPATIENT
Start: 2022-02-28 | End: 2022-05-26

## 2022-02-28 NOTE — PROGRESS NOTES
Subjective  Jan Vyas is a 46 y.o. male    HPI coming in for follow-up patient with hypertension and dyslipidemia history of hypogonadism in the past has been doing reasonably well with anxiety however continues to have intermittent OCD symptoms.  Has had complains of left shoulder pain with some radiation down his arm and occasional tingling symptoms appear to be exacerbated by movement of the shoulder joint.  Neck movement does not exacerbate symptoms.  He has received physical therapy for this in the past.  Exercises and bench presses regularly.  Significant reduction in alcohol use noted lately    The following portions of the patient's history were reviewed and updated as appropriate: allergies, current medications, past family history, past medical history, past social history, past surgical history, and problem list.     Review of Systems   Constitutional: Negative.  Negative for activity change, appetite change, fatigue and fever.   HENT: Negative for congestion, ear discharge, ear pain and trouble swallowing.    Eyes: Negative for photophobia and visual disturbance.   Respiratory: Negative for cough and shortness of breath.    Cardiovascular: Negative for chest pain and palpitations.   Gastrointestinal: Negative for abdominal distention, abdominal pain, constipation, diarrhea, nausea and vomiting.   Endocrine: Negative.    Genitourinary: Negative for dysuria, hematuria and urgency.   Musculoskeletal: Positive for arthralgias. Negative for back pain, joint swelling and myalgias.   Skin: Negative for color change and rash.   Allergic/Immunologic: Negative.    Neurological: Negative for dizziness, weakness, light-headedness and headaches.   Hematological: Negative for adenopathy. Does not bruise/bleed easily.   Psychiatric/Behavioral: Negative for agitation, confusion and dysphoric mood. The patient is not nervous/anxious.        Visit Vitals  /76   Pulse 60   Temp 97.1 °F (36.2 °C) (Infrared)  "  Ht 182.9 cm (72.01\")   Wt 95.2 kg (209 lb 12.8 oz)   SpO2 97%   BMI 28.45 kg/m²       Objective  Physical Exam  Constitutional:       General: He is not in acute distress.     Appearance: He is well-developed.   HENT:      Nose: Nose normal.   Eyes:      General: No scleral icterus.     Conjunctiva/sclera: Conjunctivae normal.   Neck:      Thyroid: No thyromegaly.      Trachea: No tracheal deviation.   Cardiovascular:      Rate and Rhythm: Normal rate and regular rhythm.      Heart sounds: No murmur heard.  No friction rub.   Pulmonary:      Effort: No respiratory distress.      Breath sounds: No wheezing or rales.   Abdominal:      General: There is no distension.      Palpations: Abdomen is soft. There is no mass.      Tenderness: There is no abdominal tenderness. There is no guarding.   Musculoskeletal:         General: No deformity. Normal range of motion.   Lymphadenopathy:      Cervical: No cervical adenopathy.   Skin:     General: Skin is warm and dry.      Findings: No erythema or rash.   Neurological:      Mental Status: He is alert and oriented to person, place, and time.      Cranial Nerves: No cranial nerve deficit.      Coordination: Coordination normal.      Deep Tendon Reflexes: Reflexes are normal and symmetric.   Psychiatric:         Behavior: Behavior normal.         Thought Content: Thought content normal.         Judgment: Judgment normal.         Diagnoses and all orders for this visit:    Essential hypertension stable with current meds and low-salt diet continue with cardiovascular exercise    Dyslipidemia continue with the dietary restrictions.  Follow lipid profile    Chronic left shoulder pain.  Discussed home exercises he wants to hold off on referral for physical therapy.  Symptoms not suggestive of nerve root impingement.  Further work-up if symptoms do not improve  Started low-dose Lexapro for OCD symptoms        "

## 2022-03-01 LAB
ALBUMIN SERPL-MCNC: 4.6 G/DL (ref 3.5–5.2)
ALBUMIN/GLOB SERPL: 1.6 G/DL
ALP SERPL-CCNC: 86 U/L (ref 39–117)
ALT SERPL-CCNC: 31 U/L (ref 1–41)
AST SERPL-CCNC: 22 U/L (ref 1–40)
BILIRUB SERPL-MCNC: 0.3 MG/DL (ref 0–1.2)
BUN SERPL-MCNC: 17 MG/DL (ref 6–20)
BUN/CREAT SERPL: 15.7 (ref 7–25)
CALCIUM SERPL-MCNC: 10.2 MG/DL (ref 8.6–10.5)
CHLORIDE SERPL-SCNC: 97 MMOL/L (ref 98–107)
CHOLEST SERPL-MCNC: 185 MG/DL (ref 0–200)
CO2 SERPL-SCNC: 27.8 MMOL/L (ref 22–29)
CREAT SERPL-MCNC: 1.08 MG/DL (ref 0.76–1.27)
EGFR GENE MUT ANL BLD/T: 86.2 ML/MIN/1.73
GLOBULIN SER CALC-MCNC: 2.8 GM/DL
GLUCOSE SERPL-MCNC: 108 MG/DL (ref 65–99)
HDLC SERPL-MCNC: 37 MG/DL (ref 40–60)
LDLC SERPL CALC-MCNC: 67 MG/DL (ref 0–100)
POTASSIUM SERPL-SCNC: 4.2 MMOL/L (ref 3.5–5.2)
PROT SERPL-MCNC: 7.4 G/DL (ref 6–8.5)
SODIUM SERPL-SCNC: 138 MMOL/L (ref 136–145)
TESTOST SERPL-MCNC: 272 NG/DL (ref 264–916)
TRIGL SERPL-MCNC: 528 MG/DL (ref 0–150)
VLDLC SERPL CALC-MCNC: 81 MG/DL (ref 5–40)

## 2022-03-11 ENCOUNTER — TELEPHONE (OUTPATIENT)
Dept: INTERNAL MEDICINE | Facility: CLINIC | Age: 47
End: 2022-03-11

## 2022-03-11 RX ORDER — BUPROPION HYDROCHLORIDE 150 MG/1
150 TABLET ORAL DAILY
Qty: 30 TABLET | Refills: 2 | Status: SHIPPED | OUTPATIENT
Start: 2022-03-11 | End: 2022-04-14 | Stop reason: SDUPTHER

## 2022-03-11 NOTE — TELEPHONE ENCOUNTER
DC Lexapro.  No antidepressant for 1 day after that.  Following day start Wellbutrin which has been called in.

## 2022-03-11 NOTE — TELEPHONE ENCOUNTER
"Patient states he has been taking Lexapro approx 12 days.  States he is having \"horrendous\" night sweats, low libido and fatigue.  Wants to know if there is another med to try.  Wife suggested wellbutrin.  Please advise.  "

## 2022-03-14 RX ORDER — MONTELUKAST SODIUM 10 MG/1
TABLET ORAL
Qty: 90 TABLET | Refills: 3 | Status: SHIPPED | OUTPATIENT
Start: 2022-03-14 | End: 2023-02-27

## 2022-03-14 RX ORDER — LEVOCETIRIZINE DIHYDROCHLORIDE 5 MG/1
TABLET, FILM COATED ORAL
Qty: 90 TABLET | Refills: 3 | Status: SHIPPED | OUTPATIENT
Start: 2022-03-14 | End: 2022-04-14 | Stop reason: SDUPTHER

## 2022-03-14 NOTE — TELEPHONE ENCOUNTER
Rx Refill Note  Requested Prescriptions     Pending Prescriptions Disp Refills   • montelukast (SINGULAIR) 10 MG tablet [Pharmacy Med Name: MONTELUKAST SOD 10 MG TABLET] 90 tablet 3     Sig: TAKE ONE TABLET BY MOUTH DAILY   • levocetirizine (XYZAL) 5 MG tablet [Pharmacy Med Name: LEVOCETIRIZINE 5 MG TABLET] 90 tablet 3     Sig: TAKE ONE TABLET BY MOUTH EVERY EVENING      Last office visit with prescribing clinician: 2/28/2022      Next office visit with prescribing clinician: Visit date not found            Tiffanie Senior LPN  03/14/22, 14:02 EDT

## 2022-03-28 DIAGNOSIS — R20.0 ARM NUMBNESS LEFT: Primary | ICD-10-CM

## 2022-04-11 RX ORDER — SUMATRIPTAN 50 MG/1
TABLET, FILM COATED ORAL
Qty: 9 TABLET | Refills: 2 | Status: SHIPPED | OUTPATIENT
Start: 2022-04-11

## 2022-04-12 ENCOUNTER — TELEPHONE (OUTPATIENT)
Dept: INTERNAL MEDICINE | Facility: CLINIC | Age: 47
End: 2022-04-12

## 2022-04-12 NOTE — TELEPHONE ENCOUNTER
Caller: Jan Vyas    Relationship to patient: Self    Best call back number:    Chief complaint: KNOT BELOW HIS STERNUM    Type of visit: OFFICE VISIT    Requested date: ASAP    Additional notes:PATIENT HAS DECLINED EXTENDERS AND WOULD LIKE THIS LOOKED AT AS SOON AS POSSIBLE; DR VILLEGAS NEXT OPENING WAS April 18TH     PLEASE CALL TO ADVISE

## 2022-04-14 ENCOUNTER — OFFICE VISIT (OUTPATIENT)
Dept: INTERNAL MEDICINE | Facility: CLINIC | Age: 47
End: 2022-04-14

## 2022-04-14 VITALS
HEART RATE: 78 BPM | OXYGEN SATURATION: 99 % | HEIGHT: 72 IN | BODY MASS INDEX: 28.85 KG/M2 | SYSTOLIC BLOOD PRESSURE: 140 MMHG | TEMPERATURE: 97.8 F | WEIGHT: 213 LBS | DIASTOLIC BLOOD PRESSURE: 88 MMHG

## 2022-04-14 DIAGNOSIS — I10 ESSENTIAL HYPERTENSION: ICD-10-CM

## 2022-04-14 DIAGNOSIS — J32.0 CHRONIC MAXILLARY SINUSITIS: Primary | ICD-10-CM

## 2022-04-14 DIAGNOSIS — M79.671 RIGHT FOOT PAIN: ICD-10-CM

## 2022-04-14 PROCEDURE — 99214 OFFICE O/P EST MOD 30 MIN: CPT | Performed by: INTERNAL MEDICINE

## 2022-04-14 RX ORDER — AMOXICILLIN AND CLAVULANATE POTASSIUM 875; 125 MG/1; MG/1
TABLET, FILM COATED ORAL
COMMUNITY
Start: 2022-04-12 | End: 2022-04-20

## 2022-04-14 RX ORDER — BUPROPION HYDROCHLORIDE 150 MG/1
150 TABLET ORAL DAILY
Qty: 90 TABLET | Refills: 3 | Status: SHIPPED | OUTPATIENT
Start: 2022-04-14 | End: 2023-04-03

## 2022-04-14 RX ORDER — FLUTICASONE PROPIONATE 50 MCG
2 SPRAY, SUSPENSION (ML) NASAL DAILY
Qty: 18.2 ML | Refills: 3 | Status: SHIPPED | OUTPATIENT
Start: 2022-04-14 | End: 2022-10-13

## 2022-04-14 RX ORDER — LEVOCETIRIZINE DIHYDROCHLORIDE 5 MG/1
5 TABLET, FILM COATED ORAL EVERY EVENING
Qty: 90 TABLET | Refills: 3 | Status: SHIPPED | OUTPATIENT
Start: 2022-04-14

## 2022-04-14 NOTE — PROGRESS NOTES
"Subjective  Jan Vyas is a 46 y.o. male    HPI coming in with complains of recurring right-sided paranasal aches with radiating pain towards his right eye especially in the evenings.  This happens to him in the spring season only.  He denies smoking.  History of migraine headaches which he says are different than this.  Has been given antibiotics in the past with some relief.  Denies postnasal drainage no fever or chills right foot discomfort with bunion noted by patient.  No new trauma    The following portions of the patient's history were reviewed and updated as appropriate: allergies, current medications, past family history, past medical history, past social history, past surgical history, and problem list.     Review of Systems   Constitutional: Negative.  Negative for activity change, appetite change, fatigue and fever.   HENT: Negative for congestion, ear discharge, ear pain and trouble swallowing.    Eyes: Negative for photophobia and visual disturbance.   Respiratory: Negative for cough and shortness of breath.    Cardiovascular: Negative for chest pain and palpitations.   Gastrointestinal: Negative for abdominal distention, abdominal pain, constipation, diarrhea, nausea and vomiting.   Endocrine: Negative.    Genitourinary: Negative for dysuria, hematuria and urgency.   Musculoskeletal: Positive for arthralgias. Negative for back pain, joint swelling and myalgias.   Skin: Negative for color change and rash.   Allergic/Immunologic: Negative.    Neurological: Negative for dizziness, weakness, light-headedness and headaches.   Hematological: Negative for adenopathy. Does not bruise/bleed easily.   Psychiatric/Behavioral: Negative for agitation, confusion and dysphoric mood. The patient is not nervous/anxious.        Visit Vitals  /88   Pulse 78   Temp 97.8 °F (36.6 °C) (Infrared)   Ht 182.9 cm (72.01\")   Wt 96.6 kg (213 lb)   SpO2 99%   BMI 28.88 kg/m²       Objective  Physical " Exam  Constitutional:       General: He is not in acute distress.     Appearance: He is well-developed.   HENT:      Nose: Nose normal.   Eyes:      General: No scleral icterus.     Conjunctiva/sclera: Conjunctivae normal.   Neck:      Thyroid: No thyromegaly.      Trachea: No tracheal deviation.   Cardiovascular:      Rate and Rhythm: Normal rate and regular rhythm.      Heart sounds: No murmur heard.    No friction rub.   Pulmonary:      Effort: No respiratory distress.      Breath sounds: No wheezing or rales.   Abdominal:      General: There is no distension.      Palpations: Abdomen is soft. There is no mass.      Tenderness: There is no abdominal tenderness. There is no guarding.   Musculoskeletal:         General: Tenderness and deformity present. Normal range of motion.   Lymphadenopathy:      Cervical: No cervical adenopathy.   Skin:     General: Skin is warm and dry.      Findings: No erythema or rash.   Neurological:      Mental Status: He is alert and oriented to person, place, and time.      Cranial Nerves: No cranial nerve deficit.      Coordination: Coordination normal.      Deep Tendon Reflexes: Reflexes are normal and symmetric.   Psychiatric:         Behavior: Behavior normal.         Thought Content: Thought content normal.         Judgment: Judgment normal.         Diagnoses and all orders for this visit:    Chronic maxillary sinusitis discussed steroid no spray because of recurring symptoms consider CT of the sinuses    Essential hypertension stable with current meds and low-salt diet    Right foot pain discussed appropriate footwear.  Denies significant pain.    Other orders  -     amoxicillin-clavulanate (AUGMENTIN) 875-125 MG per tablet

## 2022-04-15 NOTE — TELEPHONE ENCOUNTER
PATIENT INFORMED VIA DETAILED VM    Pt denies pain, nausea, SOB. Disoriented to time and place. BP elevated at 168/71 this morning. Morning scheduled hydralazine given.

## 2022-04-20 DIAGNOSIS — J32.0 CHRONIC MAXILLARY SINUSITIS: Primary | ICD-10-CM

## 2022-04-20 RX ORDER — DOXYCYCLINE HYCLATE 100 MG/1
100 CAPSULE ORAL 2 TIMES DAILY
Qty: 14 CAPSULE | Refills: 0 | Status: SHIPPED | OUTPATIENT
Start: 2022-04-20 | End: 2022-05-26

## 2022-04-21 ENCOUNTER — TELEPHONE (OUTPATIENT)
Dept: INTERNAL MEDICINE | Facility: CLINIC | Age: 47
End: 2022-04-21

## 2022-04-21 NOTE — TELEPHONE ENCOUNTER
Called and informed patient that this referral had been faxed to Dr. Hartman. Pt states he does not want to see this provider. Can you change this? Thanks

## 2022-04-21 NOTE — TELEPHONE ENCOUNTER
Caller: Jan Vyas    Relationship to patient: Self    Best call back number: [phone]    Patient is needing: PATIENT WAS CALLING TO CHECK ON STATUS OF REFERRAL FOR ENT AND WANTED TO SEE IF THIS HAS BEEN SCHEDULED    PLEASE ADVISE

## 2022-04-22 DIAGNOSIS — J32.0 CHRONIC MAXILLARY SINUSITIS: Primary | ICD-10-CM

## 2022-04-29 ENCOUNTER — APPOINTMENT (OUTPATIENT)
Dept: CT IMAGING | Facility: HOSPITAL | Age: 47
End: 2022-04-29

## 2022-04-29 ENCOUNTER — HOSPITAL ENCOUNTER (OUTPATIENT)
Dept: CT IMAGING | Facility: HOSPITAL | Age: 47
Discharge: HOME | End: 2022-04-29
Admitting: INTERNAL MEDICINE

## 2022-04-29 DIAGNOSIS — J32.0 CHRONIC MAXILLARY SINUSITIS: ICD-10-CM

## 2022-04-29 PROCEDURE — 70486 CT MAXILLOFACIAL W/O DYE: CPT

## 2022-05-26 ENCOUNTER — HOSPITAL ENCOUNTER (OUTPATIENT)
Dept: GENERAL RADIOLOGY | Facility: HOSPITAL | Age: 47
Discharge: HOME | End: 2022-05-26
Admitting: NURSE PRACTITIONER

## 2022-05-26 ENCOUNTER — OFFICE VISIT (OUTPATIENT)
Dept: NEUROLOGY | Facility: CLINIC | Age: 47
End: 2022-05-26

## 2022-05-26 VITALS
DIASTOLIC BLOOD PRESSURE: 80 MMHG | HEART RATE: 77 BPM | HEIGHT: 72 IN | BODY MASS INDEX: 28.44 KG/M2 | SYSTOLIC BLOOD PRESSURE: 130 MMHG | OXYGEN SATURATION: 98 % | WEIGHT: 210 LBS | TEMPERATURE: 97.1 F

## 2022-05-26 DIAGNOSIS — R20.2 PARESTHESIA OF LEFT ARM: ICD-10-CM

## 2022-05-26 DIAGNOSIS — M25.512 CHRONIC LEFT SHOULDER PAIN: Primary | ICD-10-CM

## 2022-05-26 DIAGNOSIS — M25.512 CHRONIC LEFT SHOULDER PAIN: ICD-10-CM

## 2022-05-26 DIAGNOSIS — G89.29 CHRONIC LEFT SHOULDER PAIN: ICD-10-CM

## 2022-05-26 DIAGNOSIS — G89.29 CHRONIC LEFT SHOULDER PAIN: Primary | ICD-10-CM

## 2022-05-26 PROCEDURE — 99214 OFFICE O/P EST MOD 30 MIN: CPT | Performed by: NURSE PRACTITIONER

## 2022-05-26 PROCEDURE — 73030 X-RAY EXAM OF SHOULDER: CPT

## 2022-05-26 NOTE — PROGRESS NOTES
New Neurology Patient Office Visit      Patient Name: Jan Vyas    Referring Physician: Lázaro Hadley MD    Chief Complaint:    Chief Complaint   Patient presents with   • Advice Only     Pt in office for left arm numbness       History of Present Illness: Jan Vyas is a very pleasant RHD 46 y.o. male who is here today to establish care with Neurology.  He was referred for evaluation of paresthesias in distal distribution of the left arm.  Think this is related to weightlifitng. While performing  press, suddenly lost strength in left arm/shoulder. Denies any popping or dislocation. Was able to continued with exercise, no weakness in LUE  He also may note symptoms are provoked when sleeping on side  Numbness started in thumb and spread to 1st 2 fingers. He now feels numbness into the forearm. He feels pain in his shoulder with activity, then may have some residual pain for a day or two.   Denies neck pain.  Hx labral tear in right shoulder.     The following portions of the patient's history were reviewed and updated as appropriate: allergies, current medications, past family history, past medical history, past social history, past surgical history and problem list.    Subjective      Review of Systems:   Review of Systems   Musculoskeletal: Positive for arthralgias. Negative for gait problem, joint swelling and myalgias.   Neurological: Positive for weakness and numbness. Negative for tremors.   All other systems reviewed and are negative.    Past Medical History:   Past Medical History:   Diagnosis Date   • Allergic    • HTN (hypertension)    • Hypogonadism male      Past Surgical History:   Past Surgical History:   Procedure Laterality Date   • FOOT FRACTURE SURGERY       Family History:   Family History   Problem Relation Age of Onset   • Diabetes Mother    • Hypertension Mother    • Hypertension Father    • Cancer Maternal Grandmother    • Diabetes Maternal Grandmother    •  "Cancer Maternal Grandfather    • Hypertension Maternal Grandfather    • Diabetes Maternal Grandfather    • Heart disease Paternal Grandfather      Social History:   Social History     Socioeconomic History   • Marital status:    Tobacco Use   • Smoking status: Never Smoker   • Smokeless tobacco: Current User     Types: Snuff   Vaping Use   • Vaping Use: Never used   Substance and Sexual Activity   • Alcohol use: Yes     Comment: social     Medications:     Current Outpatient Medications:   •  buPROPion XL (Wellbutrin XL) 150 MG 24 hr tablet, Take 1 tablet by mouth Daily., Disp: 90 tablet, Rfl: 3  •  carvedilol (COREG) 12.5 MG tablet, TAKE ONE TABLET BY MOUTH TWICE A DAY WITH MEALS, Disp: 180 tablet, Rfl: 4  •  fluticasone (FLONASE) 50 MCG/ACT nasal spray, 2 sprays by Each Nare route Daily., Disp: 18.2 mL, Rfl: 3  •  ibuprofen (ADVIL,MOTRIN) 800 MG tablet, Take  by mouth., Disp: , Rfl:   •  levocetirizine (XYZAL) 5 MG tablet, Take 1 tablet by mouth Every Evening., Disp: 90 tablet, Rfl: 3  •  losartan-hydrochlorothiazide (HYZAAR) 100-25 MG per tablet, TAKE ONE TABLET BY MOUTH DAILY, Disp: 90 tablet, Rfl: 3  •  montelukast (SINGULAIR) 10 MG tablet, TAKE ONE TABLET BY MOUTH DAILY, Disp: 90 tablet, Rfl: 3  •  omeprazole (priLOSEC) 40 MG capsule, Take 1 capsule by mouth Daily. (Patient taking differently: Take 40 mg by mouth Daily. As needed), Disp: 90 capsule, Rfl: 3  •  SUMAtriptan (IMITREX) 50 MG tablet, TAKE ONE TABLET BY MOUTH AT ONSET OF HEADACHE; MAY REPEAT ONE TABLET IN 2 HOURS IF HEADACHE NOT RELIEVED., Disp: 9 tablet, Rfl: 2    Allergies:   No Known Allergies    Objective     Physical Exam:  Vital Signs:   Vitals:    05/26/22 1009   BP: 130/80   Pulse: 77   Temp: 97.1 °F (36.2 °C)   SpO2: 98%   Weight: 95.3 kg (210 lb)   Height: 182.9 cm (72\")   PainSc: 0-No pain     Physical Exam  Vitals and nursing note reviewed.   Constitutional:       Appearance: Normal appearance.      Comments:   Well dressed  Well " groomed   Neck:      Vascular: No carotid bruit.   Cardiovascular:      Rate and Rhythm: Regular rhythm.      Heart sounds: Normal heart sounds.   Pulmonary:      Effort: Pulmonary effort is normal.      Breath sounds: Normal breath sounds.   Musculoskeletal:         General: Signs of injury present. No tenderness.   Skin:     General: Skin is warm.   Neurological:      Mental Status: He is oriented to person, place, and time.      Coordination: Romberg Test normal.      Gait: Gait is intact. Tandem walk normal.      Deep Tendon Reflexes: Strength normal.      Reflex Scores:       Bicep reflexes are 3+ on the left side.  Psychiatric:         Mood and Affect: Mood normal.         Speech: Speech normal.         Behavior: Behavior normal.         Thought Content: Thought content normal.         Judgment: Judgment normal.       Neurologic Exam     Mental Status   Oriented to person, place, and time.   Attention: normal. Concentration: normal.   Speech: speech is normal   Level of consciousness: alert  Knowledge: good.   Normal comprehension.     Cranial Nerves   Cranial nerves II through XII intact.     Motor Exam   Muscle bulk: normal  Overall muscle tone: normal  Right arm pronator drift: absent  Left arm pronator drift: absent    Strength   Strength 5/5 throughout.     Sensory Exam   Right arm light touch: normal  Left arm light touch: decreased from fingers (C8 distribution)  Right leg light touch: normal  Left leg light touch: normal    Gait, Coordination, and Reflexes     Gait  Gait: normal    Coordination   Romberg: negative  Tandem walking coordination: normal    Tremor   Resting tremor: absent    Reflexes   Reflexes 2+ except as noted.   Left biceps: 3+     XR SHOULDER 2+ VW LEFT-   COMPARISON:  None.     FINDINGS:  No fracture, subluxation, or dislocation is identified. The glenohumeral  joint is well approximated. Moderate to severe acromioclavicular joint  degenerative changes with subchondral sclerosis and  osteophytosis.  Osseous mineralization is within normal limits. No soft tissue  abnormalities. No abnormalities in the visualized portions of the lungs.     IMPRESSION:  No acute osseous findings. Moderate acromioclavicular joint degenerative  changes.     Results Review:   I have reviewed the patient's other medical records to include, labs, radiology and referrals.     Assessment / Plan      Assessment/Plan:   Diagnoses and all orders for this visit:    1. Chronic left shoulder pain (Primary)  -     Ambulatory Referral to Orthopedic Surgery  -     EMG & Nerve Conduction Test; Future  -     Ambulatory Referral to Physical Therapy Evaluate and treat  -     MRI shoulder left wo contrast; Future  -     XR shoulder 2+ vw left; Future    2. Paresthesia of left arm  -     EMG & Nerve Conduction Test; Future  -     Ambulatory Referral to Physical Therapy Evaluate and treat  -     MRI shoulder left wo contrast; Future  -     XR shoulder 2+ vw left; Future    Patient has been experiencing persistent numbness for several months in distal left arm following injury while weightlifting.  He denies any neck pain or previous neck injury.  I have ordered EMG and nerve conduction testing to assess for neuropathy or nerve injury.  His shoulder x-ray does show subchondral sclerosis and osteophytosis, patient has hyperreflexia and left bicep.  Shoulder MRI has been ordered upon consideration of labral tear or other soft tissue injury which may be contributing to symptoms.  He has been referred to physical therapy and orthopedic surgery for further evaluation and treatment.    Follow Up:   Return if symptoms worsen or fail to improve, for referral to Ortho.     SAMMI Hernandez  [facility]   AS THE PROVIDER, I PERSONALLY WORE PPE DURING ENTIRE FACE TO FACE ENCOUNTER IN CLINIC WITH THE PATIENT. PATIENT ALSO WORE PPE DURING ENTIRE FACE TO FACE ENCOUNTER EXCEPT FOR A MAX OF 30 SECONDS DURING NEUROLOGICAL  EVALUATION OF CRANIAL NERVES AND THEN MASK WAS PLACED BACK OVER PATIENT FACE FOR REMAINDER OF VISIT. I WASHED MY HANDS BEFORE AND AFTER VISIT.    Please note that portions of this note may have been completed with a voice recognition program. Efforts were made to edit the dictations, but occasionally words are mistranscribed.

## 2022-07-06 ENCOUNTER — HOSPITAL ENCOUNTER (OUTPATIENT)
Dept: MRI IMAGING | Facility: HOSPITAL | Age: 47
Discharge: HOME | End: 2022-07-06
Admitting: NURSE PRACTITIONER

## 2022-07-06 DIAGNOSIS — M25.512 CHRONIC LEFT SHOULDER PAIN: ICD-10-CM

## 2022-07-06 DIAGNOSIS — G89.29 CHRONIC LEFT SHOULDER PAIN: ICD-10-CM

## 2022-07-06 DIAGNOSIS — R20.2 PARESTHESIA OF LEFT ARM: ICD-10-CM

## 2022-07-06 PROCEDURE — 73221 MRI JOINT UPR EXTREM W/O DYE: CPT

## 2022-07-07 DIAGNOSIS — G89.29 CHRONIC LEFT SHOULDER PAIN: Primary | ICD-10-CM

## 2022-07-07 DIAGNOSIS — S43.432S TEAR OF LEFT GLENOID LABRUM, SEQUELA: ICD-10-CM

## 2022-07-07 DIAGNOSIS — M25.512 CHRONIC LEFT SHOULDER PAIN: Primary | ICD-10-CM

## 2022-07-12 ENCOUNTER — TELEPHONE (OUTPATIENT)
Dept: NEUROLOGY | Facility: CLINIC | Age: 47
End: 2022-07-12

## 2022-07-12 NOTE — TELEPHONE ENCOUNTER
----- Message from Jumana Hernandez MA sent at 7/12/2022  2:05 PM EDT -----  Regarding: FW: Ortho    ----- Message -----  From: Jan Vyas  Sent: 7/12/2022  12:35 PM EDT  To: [facility]  Subject: Ortho                                            Bibiana de la cruz Ms. Pleitez!!    I’ve not heard from the ortho. You’re going to have to work your magic for me so I can get the procedure done before school starts.

## 2022-07-12 NOTE — TELEPHONE ENCOUNTER
I HAVE SPOKEN TO PT AND GIVEN HIM THE APPT TIME. HE THOUGHT [facility] COULD GET HIM SOONER, WHEN I CALLED AND SCHEDULED APPT THAT WAS THEIR FIRST APPT FOR NEW PT. INFORMED PT AND GAVE HIM THE PHONE NUMBER TO CALL.

## 2022-09-26 ENCOUNTER — TELEPHONE (OUTPATIENT)
Dept: INTERNAL MEDICINE | Facility: CLINIC | Age: 47
End: 2022-09-26

## 2022-09-26 NOTE — TELEPHONE ENCOUNTER
Caller: Jan Vyas    Relationship: Self    Best call back number:[phone]    What orders are you requesting (i.e. lab or imaging): BLOOD WORK     In what timeframe would the patient need to come in: N/A    Where will you receive your lab/imaging services:N/A    Additional notes: PATIENT STATED THAT HE IS REQUESTING LAB WORK SO THAT HE COULD GET THESE DONE AND THEN SCHEDULE AN APPOINTMENT TO GO OVER THESE RESULTS    PLEASE ADVISE

## 2022-09-29 ENCOUNTER — OFFICE VISIT (OUTPATIENT)
Dept: INTERNAL MEDICINE | Facility: CLINIC | Age: 47
End: 2022-09-29

## 2022-09-29 VITALS
SYSTOLIC BLOOD PRESSURE: 120 MMHG | HEART RATE: 66 BPM | HEIGHT: 72 IN | BODY MASS INDEX: 28.44 KG/M2 | WEIGHT: 210 LBS | DIASTOLIC BLOOD PRESSURE: 80 MMHG | OXYGEN SATURATION: 96 % | TEMPERATURE: 97.1 F

## 2022-09-29 DIAGNOSIS — F41.9 ANXIETY: ICD-10-CM

## 2022-09-29 DIAGNOSIS — I10 ESSENTIAL HYPERTENSION: Primary | ICD-10-CM

## 2022-09-29 DIAGNOSIS — E29.1 HYPOGONADISM IN MALE: ICD-10-CM

## 2022-09-29 PROCEDURE — 99214 OFFICE O/P EST MOD 30 MIN: CPT | Performed by: INTERNAL MEDICINE

## 2022-09-29 NOTE — PROGRESS NOTES
"Subjective  Jan Vyas is a 47 y.o. male    HPI coming in for follow-up patient with hypertension history of hypogonadism and anxiety disorder.  Has complaints of fatigue and loss of energy.  Exercises regularly has followed his BP readings at home which show good control    The following portions of the patient's history were reviewed and updated as appropriate: allergies, current medications, past family history, past medical history, past social history, past surgical history, and problem list.     Review of Systems   Constitutional: Positive for fatigue. Negative for activity change, appetite change and fever.   HENT: Negative for congestion, ear discharge, ear pain and trouble swallowing.    Eyes: Negative for photophobia and visual disturbance.   Respiratory: Negative for cough and shortness of breath.    Cardiovascular: Negative for chest pain and palpitations.   Gastrointestinal: Negative for abdominal distention, abdominal pain, constipation, diarrhea, nausea and vomiting.   Endocrine: Negative.    Genitourinary: Negative for dysuria, hematuria and urgency.   Musculoskeletal: Positive for arthralgias. Negative for back pain, joint swelling and myalgias.   Skin: Negative for color change and rash.   Allergic/Immunologic: Negative.    Neurological: Negative for dizziness, weakness, light-headedness and headaches.   Hematological: Negative for adenopathy. Does not bruise/bleed easily.   Psychiatric/Behavioral: Negative for agitation, confusion and dysphoric mood. The patient is not nervous/anxious.        Visit Vitals  /80   Pulse 66   Temp 97.1 °F (36.2 °C) (Infrared)   Ht 182.9 cm (72\")   Wt 95.3 kg (210 lb)   SpO2 96%   BMI 28.48 kg/m²       Objective  Physical Exam  Constitutional:       General: He is not in acute distress.     Appearance: He is well-developed.   HENT:      Nose: Nose normal.   Eyes:      General: No scleral icterus.     Conjunctiva/sclera: Conjunctivae normal.   Neck:      " Thyroid: No thyromegaly.      Trachea: No tracheal deviation.   Cardiovascular:      Rate and Rhythm: Normal rate and regular rhythm.      Heart sounds: No murmur heard.    No friction rub.   Pulmonary:      Effort: No respiratory distress.      Breath sounds: No wheezing or rales.   Abdominal:      General: There is no distension.      Palpations: Abdomen is soft. There is no mass.      Tenderness: There is no abdominal tenderness. There is no guarding.   Musculoskeletal:         General: No deformity. Normal range of motion.   Lymphadenopathy:      Cervical: No cervical adenopathy.   Skin:     General: Skin is warm and dry.      Findings: No erythema or rash.   Neurological:      Mental Status: He is alert and oriented to person, place, and time.      Cranial Nerves: No cranial nerve deficit.      Coordination: Coordination normal.      Deep Tendon Reflexes: Reflexes are normal and symmetric.   Psychiatric:         Behavior: Behavior normal.         Thought Content: Thought content normal.         Judgment: Judgment normal.         Diagnoses and all orders for this visit:    Essential hypertension stable with current meds BP readings at home show good control tolerating meds well    Hypogonadism in male continue with exercise program.  Not on narcotic analgesics.  Check testosterone total and free along with FSH and LH    Anxiety counseled patient discussed sleep hygiene and he has cut down on alcohol use continue with Wellbutrin    Other orders  -     Cancel: FluLaval/Fluzone >6 mos ([id number])

## 2022-10-02 LAB
FSH SERPL-ACNC: 3.1 MIU/ML (ref 1.5–12.4)
LH SERPL-ACNC: 4 MIU/ML (ref 1.7–8.6)
TESTOST FREE SERPL-MCNC: 5.1 PG/ML (ref 6.8–21.5)
TESTOST SERPL-MCNC: 243 NG/DL (ref 264–916)

## 2022-10-03 ENCOUNTER — PATIENT MESSAGE (OUTPATIENT)
Dept: INTERNAL MEDICINE | Facility: CLINIC | Age: 47
End: 2022-10-03

## 2022-10-03 ENCOUNTER — TELEPHONE (OUTPATIENT)
Dept: INTERNAL MEDICINE | Facility: CLINIC | Age: 47
End: 2022-10-03

## 2022-10-03 DIAGNOSIS — E29.1 HYPOGONADISM IN MALE: Primary | ICD-10-CM

## 2022-10-13 RX ORDER — CARVEDILOL 12.5 MG/1
TABLET ORAL
Qty: 180 TABLET | Refills: 4 | Status: SHIPPED | OUTPATIENT
Start: 2022-10-13

## 2022-10-13 RX ORDER — FLUTICASONE PROPIONATE 50 MCG
SPRAY, SUSPENSION (ML) NASAL
Qty: 16 ML | Refills: 3 | Status: SHIPPED | OUTPATIENT
Start: 2022-10-13 | End: 2023-02-27

## 2022-10-26 ENCOUNTER — OFFICE VISIT (OUTPATIENT)
Dept: UROLOGY | Facility: CLINIC | Age: 47
End: 2022-10-26

## 2022-10-26 VITALS
OXYGEN SATURATION: 97 % | SYSTOLIC BLOOD PRESSURE: 126 MMHG | TEMPERATURE: 97 F | WEIGHT: 210 LBS | HEART RATE: 59 BPM | HEIGHT: 72 IN | BODY MASS INDEX: 28.44 KG/M2 | DIASTOLIC BLOOD PRESSURE: 92 MMHG

## 2022-10-26 DIAGNOSIS — E29.1 HYPOGONADISM IN MALE: Primary | ICD-10-CM

## 2022-10-26 PROCEDURE — 99214 OFFICE O/P EST MOD 30 MIN: CPT | Performed by: NURSE PRACTITIONER

## 2022-10-26 RX ORDER — BLOOD PRESSURE TEST KIT
KIT MISCELLANEOUS
Qty: 100 EACH | Refills: 11 | Status: SHIPPED | OUTPATIENT
Start: 2022-10-26

## 2022-10-26 RX ORDER — TESTOSTERONE CYPIONATE 200 MG/ML
100 INJECTION, SOLUTION INTRAMUSCULAR
Qty: 2 ML | Refills: 0 | Status: SHIPPED | OUTPATIENT
Start: 2022-10-26 | End: 2022-11-18

## 2022-10-26 NOTE — PROGRESS NOTES
Office Visit Low Testosterone      Patient Name: Jan Vyas  : 1975   MRN: [id number]     Chief Complaint:   Chief Complaint   Patient presents with   • Hypogonadism     New patient       Referring Provider: Lázaro Hadley MD    History of Present Illness: Jan Vyas is a 47 y.o. male who presents today with low testosterone.  The serum test was collected due to the following complaints: fatigue and decreased libido. His dad iis treated for hypogonadism and had used his extra testosterone in the past and admits this improved his symptoms. He has alos used testosterone many years ago when working out.     Low libido   yes  Low energy   yes  Poor sleep   yes  Mental clarity issues  yes    History of depression? yes  Erectile dysfunction?  yes    Any potential interest in conception?  no    Objective      Review of System:   Constitutional: No fevers or chills  Genitourinary: Negative for new lower urinary tract symptoms, current gross hematuria or dysuria.    Past Medical History:   Past Medical History:   Diagnosis Date   • Allergic    • HTN (hypertension)    • Hypogonadism male        Past Surgical History:   Past Surgical History:   Procedure Laterality Date   • FOOT FRACTURE SURGERY         Family History:   Family History   Problem Relation Age of Onset   • Diabetes Mother    • Hypertension Mother    • Hypertension Father    • Cancer Maternal Grandmother    • Diabetes Maternal Grandmother    • Cancer Maternal Grandfather    • Hypertension Maternal Grandfather    • Diabetes Maternal Grandfather    • Heart disease Paternal Grandfather        Social History:   Social History     Socioeconomic History   • Marital status:    Tobacco Use   • Smoking status: Never   • Smokeless tobacco: Current     Types: Snuff   Vaping Use   • Vaping Use: Never used   Substance and Sexual Activity   • Alcohol use: Yes     Comment: social   • Drug use: Never   • Sexual activity: Defer  "      Medications:     Current Outpatient Medications:   •  buPROPion XL (Wellbutrin XL) 150 MG 24 hr tablet, Take 1 tablet by mouth Daily., Disp: 90 tablet, Rfl: 3  •  carvedilol (COREG) 12.5 MG tablet, TAKE ONE TABLET BY MOUTH TWICE A DAY WITH MEALS, Disp: 180 tablet, Rfl: 4  •  fluticasone (FLONASE) 50 MCG/ACT nasal spray, SPRAY TWO SPRAYS INTO EACH NOSTRIL DAILY, Disp: 16 mL, Rfl: 3  •  ibuprofen (ADVIL,MOTRIN) 800 MG tablet, Take  by mouth., Disp: , Rfl:   •  levocetirizine (XYZAL) 5 MG tablet, Take 1 tablet by mouth Every Evening., Disp: 90 tablet, Rfl: 3  •  losartan-hydrochlorothiazide (HYZAAR) 100-25 MG per tablet, TAKE ONE TABLET BY MOUTH DAILY, Disp: 90 tablet, Rfl: 3  •  montelukast (SINGULAIR) 10 MG tablet, TAKE ONE TABLET BY MOUTH DAILY, Disp: 90 tablet, Rfl: 3  •  omeprazole (priLOSEC) 40 MG capsule, Take 1 capsule by mouth Daily. (Patient taking differently: Take 1 capsule by mouth Daily. As needed), Disp: 90 capsule, Rfl: 3  •  SUMAtriptan (IMITREX) 50 MG tablet, TAKE ONE TABLET BY MOUTH AT ONSET OF HEADACHE; MAY REPEAT ONE TABLET IN 2 HOURS IF HEADACHE NOT RELIEVED., Disp: 9 tablet, Rfl: 2  •  Alcohol Swabs pads, Clean area prior to injection, Disp: 100 each, Rfl: 11  •  Needle, Disp, 18G X 1-1/2\" misc, Use for drawing up the medication, Disp: 50 each, Rfl: 3  •  Needle, Disp, 23G X 1-1/2\" misc, 1 Device 1 (One) Time Per Week., Disp: 30 each, Rfl: 11  •  Syringe, Disposable, 3 ML misc, 1 syringe 1 (One) Time Per Week., Disp: 100 each, Rfl: 11  •  Testosterone Cypionate (Depo-Testosterone) 200 MG/ML injection, Inject 0.5 mL into the appropriate muscle as directed by prescriber Every 7 (Seven) Days., Disp: 2 mL, Rfl: 0    Allergies:   No Known Allergies    Objective     Physical Exam:   Vital Signs:   Vitals:    10/26/22 1432   BP: 126/92   BP Location: Left arm   Patient Position: Sitting   Cuff Size: Large Adult   Pulse: 59   Temp: 97 °F (36.1 °C)   TempSrc: Temporal   SpO2: 97%   Weight: 95.3 kg " "(210 lb)   Height: 182.9 cm (72\")     Body mass index is 28.48 kg/m².     Constitutional: NAD, WDWN.   Neurological: A + O x 3    Psych: normal mood and affect    Labs  No results found for: TESTOSTERONE    No results found for: PSA    Lab Results   Component Value Date    WBC 6.41 03/24/2020    HGB 14.8 03/24/2020    HCT 41.9 03/24/2020    MCV 88.0 03/24/2020     03/24/2020       Assessment / Plan    Assessment  Mr. Vyas is a 47 y.o. male with low testosterone.  Today we discussed the role testosterone plays for a male patient. I have indicated that low testosterone can be associated with metabolic syndrome, erectile dysfunction, coronary artery disease, diabetes, depression, osteoporosis, fatigue, low sex drive, poor sleep, high cholesterol, increased abdominal fat, muscle loss, irritability, hot flashes, and inability to concentrate.     Treatment options were discussed including SERMs, aromatase inhibitors, topical gel or patch, oral troches, nasal spray, testosterone injections every 2-3 weeks, long-acting injections every 10 weeks, and testosterone pellets placed in clinic every 4-6 months.    I explained the risks, benefits, and alternatives to testosterone therapies. I informed him of the potential SEs of chest and leg swelling, increased acne, change in mood, polycythemia, and worsening of undiagnosed prostate cancer.     Plan  1.  He wishes to proceed with a trial of testosterone cypionate 100mg weekly, CHARU checked  2. Check TT, H&H 3 months    Follow Up:   Return in about 3 months (around 1/26/2023) for Follow up Zackery.    SAMMI Howell  [facility]   "

## 2022-11-18 DIAGNOSIS — E29.1 HYPOGONADISM IN MALE: ICD-10-CM

## 2022-11-18 RX ORDER — TESTOSTERONE CYPIONATE 200 MG/ML
INJECTION, SOLUTION INTRAMUSCULAR
Qty: 2 ML | Refills: 0 | Status: SHIPPED | OUTPATIENT
Start: 2022-11-18 | End: 2022-12-09

## 2022-12-07 ENCOUNTER — LAB (OUTPATIENT)
Dept: LAB | Facility: HOSPITAL | Age: 47
End: 2022-12-07

## 2022-12-07 DIAGNOSIS — E29.1 HYPOGONADISM IN MALE: ICD-10-CM

## 2022-12-07 LAB
HCT VFR BLD AUTO: 40.2 % (ref 37.5–51)
HGB BLD-MCNC: 13.6 G/DL (ref 13–17.7)
TESTOST SERPL-MCNC: 362 NG/DL (ref 249–836)

## 2022-12-07 PROCEDURE — 84403 ASSAY OF TOTAL TESTOSTERONE: CPT

## 2022-12-07 PROCEDURE — 36415 COLL VENOUS BLD VENIPUNCTURE: CPT

## 2022-12-07 PROCEDURE — 85014 HEMATOCRIT: CPT

## 2022-12-07 PROCEDURE — 85018 HEMOGLOBIN: CPT

## 2022-12-08 ENCOUNTER — TELEPHONE (OUTPATIENT)
Dept: UROLOGY | Facility: CLINIC | Age: 47
End: 2022-12-08

## 2022-12-08 NOTE — TELEPHONE ENCOUNTER
Caller: Jan Vyas    Relationship: Self    Best call back number: [phone]    What is the best time to reach you: ANYTIME    Who are you requesting to speak with (clinical staff, provider,  specific staff member): CLINICAL    Do you know the name of the person who called:     What was the call regarding: RECEIVED A CALL FROM PT. HE CALLED WANTING TO KNOW HIS LAB RESULTS FROM 12/7/22. ATTEMPTED TO WARM TRANSFER TO SPEAK WITH SOMEONE REGARDING HIS RESULTS. OFFICE PLEASE ADVISE.THANK YOU.

## 2022-12-08 NOTE — TELEPHONE ENCOUNTER
gave him his results and he asked to speak with tae when she was available to discuss them more     cd,cma

## 2022-12-09 ENCOUNTER — OFFICE VISIT (OUTPATIENT)
Dept: UROLOGY | Facility: CLINIC | Age: 47
End: 2022-12-09

## 2022-12-09 DIAGNOSIS — E29.1 HYPOGONADISM IN MALE: Primary | ICD-10-CM

## 2022-12-09 PROCEDURE — 99441 PR PHYS/QHP TELEPHONE EVALUATION 5-10 MIN: CPT | Performed by: NURSE PRACTITIONER

## 2022-12-09 RX ORDER — TESTOSTERONE CYPIONATE 200 MG/ML
200 INJECTION, SOLUTION INTRAMUSCULAR WEEKLY
Qty: 2 ML | Refills: 0 | Status: SHIPPED | OUTPATIENT
Start: 2022-12-09 | End: 2022-12-27

## 2022-12-09 NOTE — PROGRESS NOTES
Office Visit Established Male Patient     Patient Name: Jan Vyas  : 1975   MRN: [id number]     Chief Complaint: No chief complaint on file.      History of Present Illness: Mr. Jan Vyas is a 47 y.o. male who agrees to visit via telephone today for follow up with HRT.  Wishes to discuss previous lab and testosterone dosage      Subjective      Review of System:   Constitutional: No fevers or chills    Past Medical History:   Past Medical History:   Diagnosis Date   • Allergic    • HTN (hypertension)    • Hypogonadism male        Past Surgical History:   Past Surgical History:   Procedure Laterality Date   • FOOT FRACTURE SURGERY         Family History:   Family History   Problem Relation Age of Onset   • Diabetes Mother    • Hypertension Mother    • Hypertension Father    • Cancer Maternal Grandmother    • Diabetes Maternal Grandmother    • Cancer Maternal Grandfather    • Hypertension Maternal Grandfather    • Diabetes Maternal Grandfather    • Heart disease Paternal Grandfather        Social History:   Social History     Socioeconomic History   • Marital status:    Tobacco Use   • Smoking status: Never   • Smokeless tobacco: Current     Types: Snuff   Vaping Use   • Vaping Use: Never used   Substance and Sexual Activity   • Alcohol use: Yes     Comment: social   • Drug use: Never   • Sexual activity: Defer       Medications:     Current Outpatient Medications:   •  Alcohol Swabs pads, Clean area prior to injection, Disp: 100 each, Rfl: 11  •  buPROPion XL (Wellbutrin XL) 150 MG 24 hr tablet, Take 1 tablet by mouth Daily., Disp: 90 tablet, Rfl: 3  •  carvedilol (COREG) 12.5 MG tablet, TAKE ONE TABLET BY MOUTH TWICE A DAY WITH MEALS, Disp: 180 tablet, Rfl: 4  •  fluticasone (FLONASE) 50 MCG/ACT nasal spray, SPRAY TWO SPRAYS INTO EACH NOSTRIL DAILY, Disp: 16 mL, Rfl: 3  •  ibuprofen (ADVIL,MOTRIN) 800 MG tablet, Take  by mouth., Disp: , Rfl:   •  levocetirizine (XYZAL) 5 MG  "tablet, Take 1 tablet by mouth Every Evening., Disp: 90 tablet, Rfl: 3  •  losartan-hydrochlorothiazide (HYZAAR) 100-25 MG per tablet, TAKE ONE TABLET BY MOUTH DAILY, Disp: 90 tablet, Rfl: 3  •  montelukast (SINGULAIR) 10 MG tablet, TAKE ONE TABLET BY MOUTH DAILY, Disp: 90 tablet, Rfl: 3  •  Needle, Disp, 18G X 1-1/2\" misc, Use for drawing up the medication, Disp: 50 each, Rfl: 3  •  Needle, Disp, 23G X 1-1/2\" misc, 1 Device 1 (One) Time Per Week., Disp: 30 each, Rfl: 11  •  omeprazole (priLOSEC) 40 MG capsule, Take 1 capsule by mouth Daily. (Patient taking differently: Take 1 capsule by mouth Daily. As needed), Disp: 90 capsule, Rfl: 3  •  SUMAtriptan (IMITREX) 50 MG tablet, TAKE ONE TABLET BY MOUTH AT ONSET OF HEADACHE; MAY REPEAT ONE TABLET IN 2 HOURS IF HEADACHE NOT RELIEVED., Disp: 9 tablet, Rfl: 2  •  Syringe, Disposable, 3 ML misc, 1 syringe 1 (One) Time Per Week., Disp: 100 each, Rfl: 11  •  Testosterone Cypionate (DEPOTESTOTERONE CYPIONATE) 200 MG/ML injection, INJECT 0.5ML INTO THE APPROPRIATE MUSCLE AS DIRECTED BY PRESCRIBER EVERY 7 DAYS, Disp: 2 mL, Rfl: 0    Allergies:   No Known Allergies    Objective     Physical Exam:   Vital Signs: There were no vitals filed for this visit.  There is no height or weight on file to calculate BMI.       Labs  Brief Urine Lab Results     None          Lab Results   Component Value Date    GLUCOSE 108 (H) 02/28/2022    CALCIUM 10.2 02/28/2022     02/28/2022    K 4.2 02/28/2022    CO2 27.8 02/28/2022    CL 97 (L) 02/28/2022    BUN 17 02/28/2022    CREATININE 1.08 02/28/2022    EGFRIFAFRI 84 03/24/2020    EGFRIFNONA 69 03/24/2020    BCR 15.7 02/28/2022    ANIONGAP 16 04/09/2015       Lab Results   Component Value Date    WBC 6.41 03/24/2020    HGB 13.6 12/07/2022    HCT 40.2 12/07/2022    MCV 88.0 03/24/2020     03/24/2020           Assessment / Plan      Assessment/Plan:   Diagnoses and all orders for this visit:    1. Hypogonadism in male " (Primary)    47-year-old male with hypogonadism following up on recent lab results after starting testosterone cypionate 100 mg weekly, TT is on the low side of normal limits patient still symptomatic with some fatigue we discussed increasing testosterone injections to 200 mg weekly and we can titrate back down as needed.  New prescription sent we will have patient follow-up in 3 months with labs prior. 10 minutes spent via phone discussing labs and dosage changes.     1. Tt, free T, and H&H 3 months    Follow Up:   No follow-ups on file.    SAMMI Howell,NP-C  [facility]

## 2022-12-14 DIAGNOSIS — E78.2 MIXED HYPERLIPIDEMIA: Primary | ICD-10-CM

## 2022-12-15 LAB
ALBUMIN SERPL-MCNC: 4.9 G/DL (ref 3.5–5.2)
ALBUMIN/GLOB SERPL: 2.3 G/DL
ALP SERPL-CCNC: 66 U/L (ref 39–117)
ALT SERPL-CCNC: 34 U/L (ref 1–41)
AST SERPL-CCNC: 28 U/L (ref 1–40)
BILIRUB SERPL-MCNC: 0.7 MG/DL (ref 0–1.2)
BUN SERPL-MCNC: 25 MG/DL (ref 6–20)
BUN/CREAT SERPL: 21 (ref 7–25)
CALCIUM SERPL-MCNC: 9.5 MG/DL (ref 8.6–10.5)
CHLORIDE SERPL-SCNC: 97 MMOL/L (ref 98–107)
CHOLEST SERPL-MCNC: 137 MG/DL (ref 0–200)
CO2 SERPL-SCNC: 31.1 MMOL/L (ref 22–29)
CREAT SERPL-MCNC: 1.19 MG/DL (ref 0.76–1.27)
EGFRCR SERPLBLD CKD-EPI 2021: 76.3 ML/MIN/1.73
GLOBULIN SER CALC-MCNC: 2.1 GM/DL
GLUCOSE SERPL-MCNC: 98 MG/DL (ref 65–99)
HDLC SERPL-MCNC: 37 MG/DL (ref 40–60)
LDLC SERPL CALC-MCNC: 81 MG/DL (ref 0–100)
POTASSIUM SERPL-SCNC: 4.1 MMOL/L (ref 3.5–5.2)
PROT SERPL-MCNC: 7 G/DL (ref 6–8.5)
SODIUM SERPL-SCNC: 138 MMOL/L (ref 136–145)
TRIGL SERPL-MCNC: 101 MG/DL (ref 0–150)
VLDLC SERPL CALC-MCNC: 19 MG/DL (ref 5–40)

## 2022-12-23 DIAGNOSIS — E29.1 HYPOGONADISM IN MALE: ICD-10-CM

## 2022-12-26 DIAGNOSIS — E29.1 HYPOGONADISM IN MALE: ICD-10-CM

## 2022-12-27 RX ORDER — OMEPRAZOLE 40 MG/1
CAPSULE, DELAYED RELEASE ORAL
Qty: 90 CAPSULE | Refills: 3 | Status: SHIPPED | OUTPATIENT
Start: 2022-12-27

## 2022-12-27 RX ORDER — TESTOSTERONE CYPIONATE 200 MG/ML
INJECTION, SOLUTION INTRAMUSCULAR
Qty: 4 ML | Refills: 0 | Status: SHIPPED | OUTPATIENT
Start: 2022-12-27 | End: 2023-01-24 | Stop reason: SDUPTHER

## 2022-12-27 NOTE — TELEPHONE ENCOUNTER
Rx Refill Note  Requested Prescriptions     Pending Prescriptions Disp Refills   • omeprazole (priLOSEC) 40 MG capsule [Pharmacy Med Name: OMEPRAZOLE DR 40 MG CAPSULE] 90 capsule 3     Sig: TAKE ONE CAPSULE BY MOUTH DAILY      Last office visit with prescribing clinician: 9/29/2022   Last telemedicine visit with prescribing clinician: Visit date not found   Next office visit with prescribing clinician: Visit date not found                         Would you like a call back once the refill request has been completed: [] Yes [] No    If the office needs to give you a call back, can they leave a voicemail: [] Yes [] No    Reena Cisneros LPN  12/27/22, 09:24 EST

## 2023-01-12 ENCOUNTER — LAB (OUTPATIENT)
Dept: LAB | Facility: HOSPITAL | Age: 48
End: 2023-01-12
Payer: COMMERCIAL

## 2023-01-12 DIAGNOSIS — E29.1 HYPOGONADISM IN MALE: ICD-10-CM

## 2023-01-12 LAB
HCT VFR BLD AUTO: 43 % (ref 37.5–51)
HGB BLD-MCNC: 14.9 G/DL (ref 13–17.7)

## 2023-01-12 PROCEDURE — 36415 COLL VENOUS BLD VENIPUNCTURE: CPT

## 2023-01-12 PROCEDURE — 85014 HEMATOCRIT: CPT

## 2023-01-12 PROCEDURE — 84403 ASSAY OF TOTAL TESTOSTERONE: CPT

## 2023-01-12 PROCEDURE — 85018 HEMOGLOBIN: CPT

## 2023-01-12 PROCEDURE — 84402 ASSAY OF FREE TESTOSTERONE: CPT

## 2023-01-16 ENCOUNTER — OFFICE VISIT (OUTPATIENT)
Dept: UROLOGY | Facility: CLINIC | Age: 48
End: 2023-01-16
Payer: COMMERCIAL

## 2023-01-16 VITALS
OXYGEN SATURATION: 97 % | WEIGHT: 210 LBS | TEMPERATURE: 98.1 F | DIASTOLIC BLOOD PRESSURE: 80 MMHG | HEART RATE: 97 BPM | SYSTOLIC BLOOD PRESSURE: 126 MMHG | HEIGHT: 72 IN | RESPIRATION RATE: 19 BRPM | BODY MASS INDEX: 28.44 KG/M2

## 2023-01-16 DIAGNOSIS — E29.1 HYPOGONADISM IN MALE: Primary | ICD-10-CM

## 2023-01-16 PROCEDURE — 99214 OFFICE O/P EST MOD 30 MIN: CPT | Performed by: NURSE PRACTITIONER

## 2023-01-16 RX ORDER — NEEDLES, DISPOSABLE 25GX5/8"
NEEDLE, DISPOSABLE MISCELLANEOUS
Qty: 50 EACH | Refills: 11 | Status: SHIPPED | OUTPATIENT
Start: 2023-01-16

## 2023-01-16 NOTE — PROGRESS NOTES
Office Visit Established Male Patient     Patient Name: Jan Vyas  : 1975   MRN: [id number]     Chief Complaint:   Chief Complaint   Patient presents with   • Follow-up     Low Testosterone         History of Present Illness: Mr. Jan Vyas is a 47 y.o. male who presents today for follow up with hypogonadism.  Patient has been injecting 200 mg weekly reports he is feeling much better his brain fog has improved, as well as his energy and libido      Subjective      Review of System:   Constitutional: No fevers or chills    Past Medical History:   Past Medical History:   Diagnosis Date   • Allergic    • HTN (hypertension)    • Hypogonadism male        Past Surgical History:   Past Surgical History:   Procedure Laterality Date   • FOOT FRACTURE SURGERY         Family History:   Family History   Problem Relation Age of Onset   • Diabetes Mother    • Hypertension Mother    • Hypertension Father    • Cancer Maternal Grandmother    • Diabetes Maternal Grandmother    • Cancer Maternal Grandfather    • Hypertension Maternal Grandfather    • Diabetes Maternal Grandfather    • Heart disease Paternal Grandfather        Social History:   Social History     Socioeconomic History   • Marital status:    Tobacco Use   • Smoking status: Never   • Smokeless tobacco: Current     Types: Snuff   Vaping Use   • Vaping Use: Never used   Substance and Sexual Activity   • Alcohol use: Yes     Comment: social   • Drug use: Never   • Sexual activity: Defer       Medications:     Current Outpatient Medications:   •  Alcohol Swabs pads, Clean area prior to injection, Disp: 100 each, Rfl: 11  •  buPROPion XL (Wellbutrin XL) 150 MG 24 hr tablet, Take 1 tablet by mouth Daily., Disp: 90 tablet, Rfl: 3  •  carvedilol (COREG) 12.5 MG tablet, TAKE ONE TABLET BY MOUTH TWICE A DAY WITH MEALS, Disp: 180 tablet, Rfl: 4  •  fluticasone (FLONASE) 50 MCG/ACT nasal spray, SPRAY TWO SPRAYS INTO EACH NOSTRIL DAILY, Disp:  "16 mL, Rfl: 3  •  ibuprofen (ADVIL,MOTRIN) 800 MG tablet, Take  by mouth., Disp: , Rfl:   •  levocetirizine (XYZAL) 5 MG tablet, Take 1 tablet by mouth Every Evening., Disp: 90 tablet, Rfl: 3  •  losartan-hydrochlorothiazide (HYZAAR) 100-25 MG per tablet, TAKE ONE TABLET BY MOUTH DAILY, Disp: 90 tablet, Rfl: 3  •  montelukast (SINGULAIR) 10 MG tablet, TAKE ONE TABLET BY MOUTH DAILY, Disp: 90 tablet, Rfl: 3  •  Needle, Disp, (BD Disp Needle) 23G X 1\" misc, Use to inject 1 time weekly, Disp: 50 each, Rfl: 11  •  Needle, Disp, 18G X 1-1/2\" misc, Use for drawing up the medication, Disp: 50 each, Rfl: 3  •  omeprazole (priLOSEC) 40 MG capsule, TAKE ONE CAPSULE BY MOUTH DAILY, Disp: 90 capsule, Rfl: 3  •  SUMAtriptan (IMITREX) 50 MG tablet, TAKE ONE TABLET BY MOUTH AT ONSET OF HEADACHE; MAY REPEAT ONE TABLET IN 2 HOURS IF HEADACHE NOT RELIEVED., Disp: 9 tablet, Rfl: 2  •  Syringe, Disposable, 3 ML misc, 1 syringe 1 (One) Time Per Week., Disp: 100 each, Rfl: 11  •  Testosterone Cypionate (DEPOTESTOTERONE CYPIONATE) 200 MG/ML injection, INJECT 1ML INTO THE APPROPRIATE MUSCLE AS DIRECTED BY PRESCRIBER ONE TIME PER WEEK, Disp: 4 mL, Rfl: 0    Allergies:   No Known Allergies    Objective     Physical Exam:   Vital Signs:   Vitals:    01/16/23 1457   BP: 126/80   BP Location: Right arm   Patient Position: Sitting   Cuff Size: Adult   Pulse: 97   Resp: 19   Temp: 98.1 °F (36.7 °C)   TempSrc: Temporal   SpO2: 97%   Weight: 95.3 kg (210 lb)   Height: 182.9 cm (72.01\")     Body mass index is 28.47 kg/m².     Physical Exam  Constitutional: NAD, WDWN.   Neurological: A + O x 3.   Psychiatric:  Normal mood and affect      Labs  Brief Urine Lab Results     None          Lab Results   Component Value Date    GLUCOSE 98 12/15/2022    CALCIUM 9.5 12/15/2022     12/15/2022    K 4.1 12/15/2022    CO2 31.1 (H) 12/15/2022    CL 97 (L) 12/15/2022    BUN 25 (H) 12/15/2022    CREATININE 1.19 12/15/2022    EGFRIFAFRI 84 03/24/2020    " "EGFRIFNONA 69 03/24/2020    BCR 21.0 12/15/2022    ANIONGAP 16 04/09/2015       Lab Results   Component Value Date    WBC 6.41 03/24/2020    HGB 14.9 01/12/2023    HCT 43.0 01/12/2023    MCV 88.0 03/24/2020     03/24/2020           Assessment / Plan      Assessment/Plan:   Diagnoses and all orders for this visit:    1. Hypogonadism in male (Primary)  -     Testosterone; Future  -     Estradiol; Future  -     Hemoglobin & Hematocrit, Blood; Future  -     Needle, Disp, (BD Disp Needle) 23G X 1\" misc; Use to inject 1 time weekly  Dispense: 50 each; Refill: 11    47-year-old male here for his follow-up with hypogonadism he had started testosterone cypionate 200 mg weekly he is doing well on this dosage his testosterone levels at peak are 830 within the normal range.  Hematocrit and hemoglobin are normal.  Patient wishes to repeat his labs again in 3 months to monitor closely we discussed we can repeat testosterone and H&H and estradiol in 3 months and will follow-up in 6 months.    1. TT, H&H, and estradiol ordered for 3 and 6 months    Follow Up:   Return in about 6 months (around 7/16/2023) for Follow up Zackery.    SAMMI Howell,NP-C  [facility]   "

## 2023-01-17 LAB
TESTOST FREE SERPL-MCNC: 15 PG/ML (ref 6.8–21.5)
TESTOST SERPL-MCNC: 830 NG/DL (ref 264–916)

## 2023-01-21 DIAGNOSIS — I10 ESSENTIAL HYPERTENSION: ICD-10-CM

## 2023-01-21 RX ORDER — LOSARTAN POTASSIUM AND HYDROCHLOROTHIAZIDE 25; 100 MG/1; MG/1
TABLET ORAL
Qty: 90 TABLET | Refills: 3 | Status: SHIPPED | OUTPATIENT
Start: 2023-01-21

## 2023-01-24 DIAGNOSIS — E29.1 HYPOGONADISM IN MALE: Primary | ICD-10-CM

## 2023-01-24 RX ORDER — TESTOSTERONE CYPIONATE 200 MG/ML
200 INJECTION, SOLUTION INTRAMUSCULAR
Qty: 4 ML | Refills: 0 | Status: SHIPPED | OUTPATIENT
Start: 2023-01-24 | End: 2023-02-27

## 2023-02-22 ENCOUNTER — LAB (OUTPATIENT)
Dept: LAB | Facility: HOSPITAL | Age: 48
End: 2023-02-22
Payer: COMMERCIAL

## 2023-02-22 DIAGNOSIS — E29.1 HYPOGONADISM IN MALE: ICD-10-CM

## 2023-02-22 LAB
HCT VFR BLD AUTO: 45.1 % (ref 37.5–51)
HGB BLD-MCNC: 15 G/DL (ref 13–17.7)
TESTOST SERPL-MCNC: 769 NG/DL (ref 249–836)

## 2023-02-22 PROCEDURE — 85018 HEMOGLOBIN: CPT

## 2023-02-22 PROCEDURE — 36415 COLL VENOUS BLD VENIPUNCTURE: CPT

## 2023-02-22 PROCEDURE — 84403 ASSAY OF TOTAL TESTOSTERONE: CPT

## 2023-02-22 PROCEDURE — 84153 ASSAY OF PSA TOTAL: CPT

## 2023-02-22 PROCEDURE — 85014 HEMATOCRIT: CPT

## 2023-02-23 LAB — PSA SERPL-MCNC: 0.2 NG/ML (ref 0–4)

## 2023-02-25 DIAGNOSIS — E29.1 HYPOGONADISM IN MALE: ICD-10-CM

## 2023-02-27 RX ORDER — TESTOSTERONE CYPIONATE 200 MG/ML
INJECTION, SOLUTION INTRAMUSCULAR
Qty: 4 ML | Refills: 0 | Status: SHIPPED | OUTPATIENT
Start: 2023-02-27 | End: 2023-04-05 | Stop reason: SDUPTHER

## 2023-02-27 RX ORDER — FLUTICASONE PROPIONATE 50 MCG
SPRAY, SUSPENSION (ML) NASAL
Qty: 16 ML | Refills: 3 | Status: SHIPPED | OUTPATIENT
Start: 2023-02-27

## 2023-02-27 RX ORDER — MONTELUKAST SODIUM 10 MG/1
TABLET ORAL
Qty: 90 TABLET | Refills: 3 | Status: SHIPPED | OUTPATIENT
Start: 2023-02-27

## 2023-04-03 RX ORDER — BUPROPION HYDROCHLORIDE 150 MG/1
TABLET ORAL
Qty: 90 TABLET | Refills: 3 | Status: SHIPPED | OUTPATIENT
Start: 2023-04-03

## 2023-04-03 NOTE — TELEPHONE ENCOUNTER
Rx Refill Note  Requested Prescriptions     Pending Prescriptions Disp Refills   • buPROPion XL (WELLBUTRIN XL) 150 MG 24 hr tablet [Pharmacy Med Name: buPROPion HCL  MG TABLET] 90 tablet 3     Sig: TAKE ONE TABLET BY MOUTH DAILY      Last office visit with prescribing clinician: 9/29/2022   Last telemedicine visit with prescribing clinician: Visit date not found   Next office visit with prescribing clinician: Visit date not found                         Would you like a call back once the refill request has been completed: [] Yes [] No    If the office needs to give you a call back, can they leave a voicemail: [] Yes [] No    Reena Cisneros LPN  04/03/23, 09:44 EDT

## 2023-04-05 DIAGNOSIS — E29.1 HYPOGONADISM IN MALE: ICD-10-CM

## 2023-04-05 RX ORDER — TESTOSTERONE CYPIONATE 200 MG/ML
200 INJECTION, SOLUTION INTRAMUSCULAR
Qty: 4 ML | Refills: 0 | Status: SHIPPED | OUTPATIENT
Start: 2023-04-05

## 2023-04-27 DIAGNOSIS — E29.1 HYPOGONADISM IN MALE: ICD-10-CM

## 2023-04-27 RX ORDER — TESTOSTERONE CYPIONATE 200 MG/ML
INJECTION, SOLUTION INTRAMUSCULAR
Qty: 4 ML | Refills: 0 | Status: SHIPPED | OUTPATIENT
Start: 2023-04-27

## 2023-05-24 DIAGNOSIS — E29.1 HYPOGONADISM IN MALE: ICD-10-CM

## 2023-05-24 RX ORDER — LEVOCETIRIZINE DIHYDROCHLORIDE 5 MG/1
TABLET, FILM COATED ORAL
Qty: 90 TABLET | Refills: 3 | Status: SHIPPED | OUTPATIENT
Start: 2023-05-24

## 2023-05-25 RX ORDER — TESTOSTERONE CYPIONATE 200 MG/ML
INJECTION, SOLUTION INTRAMUSCULAR
Qty: 4 ML | Refills: 0 | Status: SHIPPED | OUTPATIENT
Start: 2023-05-25

## 2023-06-07 ENCOUNTER — LAB (OUTPATIENT)
Dept: LAB | Facility: HOSPITAL | Age: 48
End: 2023-06-07
Payer: COMMERCIAL

## 2023-06-07 PROCEDURE — 82670 ASSAY OF TOTAL ESTRADIOL: CPT | Performed by: INTERNAL MEDICINE

## 2023-06-07 PROCEDURE — 85018 HEMOGLOBIN: CPT | Performed by: NURSE PRACTITIONER

## 2023-06-07 PROCEDURE — 85014 HEMATOCRIT: CPT | Performed by: NURSE PRACTITIONER

## 2023-06-07 PROCEDURE — 83001 ASSAY OF GONADOTROPIN (FSH): CPT | Performed by: INTERNAL MEDICINE

## 2023-06-07 PROCEDURE — 84402 ASSAY OF FREE TESTOSTERONE: CPT | Performed by: INTERNAL MEDICINE

## 2023-06-07 PROCEDURE — 84403 ASSAY OF TOTAL TESTOSTERONE: CPT | Performed by: INTERNAL MEDICINE

## 2023-06-08 LAB — FSH SERPL-ACNC: <0.3 MIU/ML (ref 1.5–12.4)

## 2023-06-09 LAB
TESTOST FREE SERPL-MCNC: 17.5 PG/ML (ref 6.8–21.5)
TESTOST SERPL-MCNC: 789 NG/DL (ref 264–916)

## 2023-06-16 ENCOUNTER — OFFICE VISIT (OUTPATIENT)
Dept: UROLOGY | Facility: CLINIC | Age: 48
End: 2023-06-16
Payer: COMMERCIAL

## 2023-06-16 VITALS
SYSTOLIC BLOOD PRESSURE: 130 MMHG | BODY MASS INDEX: 28.44 KG/M2 | DIASTOLIC BLOOD PRESSURE: 82 MMHG | WEIGHT: 210 LBS | HEIGHT: 72 IN

## 2023-06-16 DIAGNOSIS — E29.1 HYPOGONADISM IN MALE: ICD-10-CM

## 2023-06-16 RX ORDER — NEEDLES, DISPOSABLE 25GX5/8"
NEEDLE, DISPOSABLE MISCELLANEOUS
Qty: 50 EACH | Refills: 3 | Status: SHIPPED | OUTPATIENT
Start: 2023-06-16

## 2023-06-16 RX ORDER — ANASTROZOLE 1 MG/1
1 TABLET ORAL WEEKLY
Qty: 12 TABLET | Refills: 3 | Status: SHIPPED | OUTPATIENT
Start: 2023-06-16

## 2023-06-16 RX ORDER — NEEDLES, DISPOSABLE 18GX1 1/2"
NEEDLE, DISPOSABLE MISCELLANEOUS
Status: CANCELLED | OUTPATIENT
Start: 2023-06-16

## 2023-06-16 NOTE — PROGRESS NOTES
Office Visit Established Male Patient     Patient Name: Jan Vyas  : 1975   MRN: [id number]     Chief Complaint:   Chief Complaint   Patient presents with   • Follow-up     6month hypogonadism         History of Present Illness: Mr. Jan Vyas is a 47 y.o. male who presents today for follow up with hypogonadism.  Patient is doing well, feeling good on testosterone.  Patient states he feels testosterone is greatly improved his symptoms and how he feels.      Subjective      Review of System:   As noted in HPI    Past Medical History:   Past Medical History:   Diagnosis Date   • Allergic    • HTN (hypertension)    • Hypogonadism male        Past Surgical History:   Past Surgical History:   Procedure Laterality Date   • FOOT FRACTURE SURGERY         Family History:   Family History   Problem Relation Age of Onset   • Diabetes Mother    • Hypertension Mother    • Hypertension Father    • Cancer Maternal Grandmother    • Diabetes Maternal Grandmother    • Cancer Maternal Grandfather    • Hypertension Maternal Grandfather    • Diabetes Maternal Grandfather    • Heart disease Paternal Grandfather        Social History:   Social History     Socioeconomic History   • Marital status:    Tobacco Use   • Smoking status: Never   • Smokeless tobacco: Current     Types: Snuff   Vaping Use   • Vaping Use: Never used   Substance and Sexual Activity   • Alcohol use: Yes     Comment: social   • Drug use: Never   • Sexual activity: Defer       Medications:     Current Outpatient Medications:   •  Alcohol Swabs pads, Clean area prior to injection, Disp: 100 each, Rfl: 11  •  anastrozole (Arimidex) 1 MG tablet, Take 1 tablet by mouth 1 (One) Time Per Week., Disp: 12 tablet, Rfl: 3  •  buPROPion XL (WELLBUTRIN XL) 150 MG 24 hr tablet, TAKE ONE TABLET BY MOUTH DAILY, Disp: 90 tablet, Rfl: 3  •  carvedilol (COREG) 12.5 MG tablet, TAKE ONE TABLET BY MOUTH TWICE A DAY WITH MEALS, Disp: 180 tablet,  "Rfl: 4  •  fluticasone (FLONASE) 50 MCG/ACT nasal spray, SPRAY TWO SPRAYS IN EACH NOSTRIL ONCE DAILY, Disp: 16 mL, Rfl: 3  •  ibuprofen (ADVIL,MOTRIN) 800 MG tablet, Take  by mouth., Disp: , Rfl:   •  levocetirizine (XYZAL) 5 MG tablet, TAKE ONE TABLET BY MOUTH EVERY EVENING, Disp: 90 tablet, Rfl: 3  •  losartan-hydrochlorothiazide (HYZAAR) 100-25 MG per tablet, TAKE ONE TABLET BY MOUTH DAILY, Disp: 90 tablet, Rfl: 3  •  montelukast (SINGULAIR) 10 MG tablet, TAKE ONE TABLET BY MOUTH DAILY, Disp: 90 tablet, Rfl: 3  •  Needle, Disp, (B-D DISP NEEDLE 25GX1\") 25G X 1\" misc, Use 1 device 2 times weekly for injection, Disp: 50 each, Rfl: 3  •  Needle, Disp, (BD Disp Needle) 23G X 1\" misc, Use to inject 1 time weekly, Disp: 50 each, Rfl: 11  •  Needle, Disp, 18G X 1-1/2\" misc, Use for drawing up the medication, Disp: 50 each, Rfl: 3  •  omeprazole (priLOSEC) 40 MG capsule, TAKE ONE CAPSULE BY MOUTH DAILY, Disp: 90 capsule, Rfl: 3  •  SUMAtriptan (IMITREX) 50 MG tablet, TAKE ONE TABLET BY MOUTH AT ONSET OF HEADACHE; MAY REPEAT ONE TABLET IN 2 HOURS IF HEADACHE NOT RELIEVED., Disp: 9 tablet, Rfl: 2  •  Syringe, Disposable, 3 ML misc, 1 syringe 1 (One) Time Per Week., Disp: 100 each, Rfl: 11  •  Testosterone Cypionate (DEPOTESTOTERONE CYPIONATE) 200 MG/ML injection, INJECT 1 ML INTO THE APPROPRIATE MUSCLE AS DIRECTED EVERY 7 DAYS, Disp: 4 mL, Rfl: 0    Allergies:   No Known Allergies    Objective     Physical Exam:   Vital Signs:   Vitals:    06/16/23 1302   BP: 130/82   BP Location: Left arm   Patient Position: Sitting   Cuff Size: Adult   Weight: 95.3 kg (210 lb)   Height: 182.9 cm (72.01\")     Body mass index is 28.47 kg/m².     Physical Exam  Constitutional: NAD, WDWN.   Neurological: A + O x 3.   Psychiatric:  Normal mood and affect      Labs  Brief Urine Lab Results     None          Lab Results   Component Value Date    GLUCOSE 98 12/15/2022    CALCIUM 9.5 12/15/2022     12/15/2022    K 4.1 12/15/2022    CO2 31.1 " "(H) 12/15/2022    CL 97 (L) 12/15/2022    BUN 25 (H) 12/15/2022    CREATININE 1.19 12/15/2022    EGFRIFAFRI 84 03/24/2020    EGFRIFNONA 69 03/24/2020    BCR 21.0 12/15/2022    ANIONGAP 16 04/09/2015       Lab Results   Component Value Date    WBC 6.41 03/24/2020    HGB 14.9 06/07/2023    HCT 44.1 06/07/2023    MCV 88.0 03/24/2020     03/24/2020           I have reviewed the above labs.     Assessment / Plan      Assessment/Plan:   Diagnoses and all orders for this visit:    1. Hypogonadism in male  -     anastrozole (Arimidex) 1 MG tablet; Take 1 tablet by mouth 1 (One) Time Per Week.  Dispense: 12 tablet; Refill: 3  -     Needle, Disp, (B-D DISP NEEDLE 25GX1\") 25G X 1\" misc; Use 1 device 2 times weekly for injection  Dispense: 50 each; Refill: 3    47-year-old male here for hypogonadism follow-up.  Today we reviewed testosterone is at a therapeutic level, estradiol is elevated we reviewed taking Arimidex once weekly to counteract aromatization from testosterone therapy.  Patient would like to try a smaller needle for injecting and would like to 1 inch, will trial 25-gauge 1 inch patient is in agreement with this management we will repeat labs in 6 months    1. Start Arimidex 1 pill weekly  2. Continue testosterone biweekly injections at 200 mg weekly  3. Recheck testosterone, estradiol, hematocrit and hemoglobin    Follow Up:   No follow-ups on file.    SAMMI Howell,NP-C  [facility] [place]   "

## 2023-06-22 PROBLEM — M77.8 FOREARM TENDONITIS: Status: ACTIVE | Noted: 2023-06-22

## 2023-07-27 DIAGNOSIS — E29.1 HYPOGONADISM IN MALE: ICD-10-CM

## 2023-07-28 RX ORDER — TESTOSTERONE CYPIONATE 200 MG/ML
INJECTION, SOLUTION INTRAMUSCULAR
Qty: 4 ML | Refills: 0 | Status: SHIPPED | OUTPATIENT
Start: 2023-07-28

## 2023-08-14 ENCOUNTER — TELEPHONE (OUTPATIENT)
Dept: INTERNAL MEDICINE | Facility: CLINIC | Age: 48
End: 2023-08-14
Payer: COMMERCIAL

## 2023-08-14 NOTE — TELEPHONE ENCOUNTER
Caller: Jan Vyas    Relationship to patient: Self    Best call back number: [phone]     Chief complaint: FU FOOT ISSUES    Type of visit: FOLLOWUP    Requested date: THIS WEEK     Additional notes:  PATIENT HAS SEEN DR SENIOR FOR HIS FOOT ISSUES AND WANTED TO SEE IF HE COULD SEE HIM THIS WEEK, BUT THERE WAS NO AVAILABILITY.  HE ALSO WANTED TO SEE IF DR SENIOR WOULD ORDER BLOOD WORK.

## 2023-08-15 NOTE — TELEPHONE ENCOUNTER
I called patient offered an appointment with another provider. Patient would rather see Dr. Hadley, I also discussed Podiatrist appointment but, he and Dr. Hadley already discussed this also. Appt. Made for Thursday.

## 2023-08-17 ENCOUNTER — OFFICE VISIT (OUTPATIENT)
Dept: INTERNAL MEDICINE | Facility: CLINIC | Age: 48
End: 2023-08-17
Payer: COMMERCIAL

## 2023-08-17 VITALS
OXYGEN SATURATION: 97 % | WEIGHT: 218.8 LBS | BODY MASS INDEX: 29.64 KG/M2 | HEART RATE: 74 BPM | TEMPERATURE: 97.3 F | SYSTOLIC BLOOD PRESSURE: 110 MMHG | DIASTOLIC BLOOD PRESSURE: 80 MMHG | HEIGHT: 72 IN

## 2023-08-17 DIAGNOSIS — M79.671 RIGHT FOOT PAIN: ICD-10-CM

## 2023-08-17 DIAGNOSIS — E29.1 HYPOGONADISM IN MALE: ICD-10-CM

## 2023-08-17 DIAGNOSIS — I10 ESSENTIAL HYPERTENSION: Primary | ICD-10-CM

## 2023-08-17 DIAGNOSIS — E78.5 DYSLIPIDEMIA: ICD-10-CM

## 2023-08-17 PROCEDURE — 99214 OFFICE O/P EST MOD 30 MIN: CPT | Performed by: INTERNAL MEDICINE

## 2023-08-17 NOTE — PROGRESS NOTES
"Subjective  Jan Vyas is a 48 y.o. male    HPI coming in for follow-up patient with a history of hypertension and hypogonadism has complaints of right foot pain feels he has a bunion.  No new trauma active with exercise on a daily basis has not had a history of gout denies acute swelling or redness in his big toe    The following portions of the patient's history were reviewed and updated as appropriate: allergies, current medications, past family history, past medical history, past social history, past surgical history, and problem list.     Review of Systems   Constitutional: Negative.  Negative for activity change, appetite change, fatigue and fever.   HENT:  Negative for congestion, ear discharge, ear pain and trouble swallowing.    Eyes:  Negative for photophobia and visual disturbance.   Respiratory:  Negative for cough and shortness of breath.    Cardiovascular:  Negative for chest pain and palpitations.   Gastrointestinal:  Negative for abdominal distention, abdominal pain, constipation, diarrhea, nausea and vomiting.   Endocrine: Negative.    Genitourinary:  Negative for dysuria, hematuria and urgency.   Musculoskeletal:  Positive for arthralgias. Negative for back pain, joint swelling and myalgias.   Skin:  Negative for color change and rash.   Allergic/Immunologic: Negative.    Neurological:  Negative for dizziness, weakness, light-headedness and headaches.   Hematological:  Negative for adenopathy. Does not bruise/bleed easily.   Psychiatric/Behavioral:  Negative for agitation, confusion and dysphoric mood. The patient is not nervous/anxious.      Visit Vitals  /80 (BP Location: Right arm, Patient Position: Sitting, Cuff Size: Adult)   Pulse 74   Temp 97.3 øF (36.3 øC)   Ht 182.9 cm (72.01\")   Wt 99.2 kg (218 lb 12.8 oz)   SpO2 97%   BMI 29.67 kg/mý       Objective  Physical Exam  Constitutional:       General: He is not in acute distress.     Appearance: He is well-developed.   HENT:     "  Nose: Nose normal.   Eyes:      General: No scleral icterus.     Conjunctiva/sclera: Conjunctivae normal.   Neck:      Thyroid: No thyromegaly.      Trachea: No tracheal deviation.   Cardiovascular:      Rate and Rhythm: Normal rate and regular rhythm.      Heart sounds: No murmur heard.    No friction rub.   Pulmonary:      Effort: No respiratory distress.      Breath sounds: No wheezing or rales.   Abdominal:      General: There is no distension.      Palpations: Abdomen is soft. There is no mass.      Tenderness: There is no abdominal tenderness. There is no guarding.   Musculoskeletal:         General: Deformity present. Normal range of motion.   Lymphadenopathy:      Cervical: No cervical adenopathy.   Skin:     General: Skin is warm and dry.      Findings: No erythema or rash.   Neurological:      Mental Status: He is alert and oriented to person, place, and time.      Cranial Nerves: No cranial nerve deficit.      Coordination: Coordination normal.      Deep Tendon Reflexes: Reflexes are normal and symmetric.   Psychiatric:         Behavior: Behavior normal.         Thought Content: Thought content normal.         Judgment: Judgment normal.       Diagnoses and all orders for this visit:    Essential hypertension BP control much better continue with cardiovascular exercise.  Has stopped alcohol    Hypogonadism in male on testosterone supplement following up with urology    Dyslipidemia continue with the dietary restrictions follow lipid profile    Right foot pain with evidence of hallux valgus.  Referral to foot orthopedics.  Discussed appropriate footwear  -     Ambulatory Referral to Orthopedic Surgery

## 2023-08-23 DIAGNOSIS — E29.1 HYPOGONADISM IN MALE: ICD-10-CM

## 2023-08-23 DIAGNOSIS — Z12.11 SCREEN FOR COLON CANCER: Primary | ICD-10-CM

## 2023-08-23 RX ORDER — TESTOSTERONE CYPIONATE 200 MG/ML
INJECTION, SOLUTION INTRAMUSCULAR
Qty: 4 ML | Refills: 0 | Status: SHIPPED | OUTPATIENT
Start: 2023-08-23

## 2023-09-29 DIAGNOSIS — E29.1 HYPOGONADISM IN MALE: ICD-10-CM

## 2023-09-29 RX ORDER — TESTOSTERONE CYPIONATE 200 MG/ML
200 INJECTION, SOLUTION INTRAMUSCULAR
Qty: 4 ML | Refills: 0 | Status: SHIPPED | OUTPATIENT
Start: 2023-09-29

## 2023-09-29 RX ORDER — LEVOCETIRIZINE DIHYDROCHLORIDE 5 MG/1
5 TABLET, FILM COATED ORAL EVERY EVENING
Qty: 90 TABLET | Refills: 3 | Status: SHIPPED | OUTPATIENT
Start: 2023-09-29

## 2023-10-06 ENCOUNTER — OFFICE VISIT (OUTPATIENT)
Dept: ORTHOPEDIC SURGERY | Facility: CLINIC | Age: 48
End: 2023-10-06
Payer: COMMERCIAL

## 2023-10-06 VITALS
DIASTOLIC BLOOD PRESSURE: 72 MMHG | HEIGHT: 72 IN | WEIGHT: 210.8 LBS | BODY MASS INDEX: 28.55 KG/M2 | SYSTOLIC BLOOD PRESSURE: 118 MMHG

## 2023-10-06 DIAGNOSIS — M79.671 RIGHT FOOT PAIN: Primary | ICD-10-CM

## 2023-10-06 NOTE — PROGRESS NOTES
[facility] [place] [facility] Office Visit     Office Visit       Date: 10/06/2023   Patient Name: Jan Vyas  MRN: [id number]  YOB: 1975    Referring Physician: Lázaro Hadley MD     Chief Complaint:   Chief Complaint   Patient presents with    Right Foot - Pain, Initial Evaluation       History of Present Illness: Jan Vyas is a 48 y.o. male who is here today with right forefoot pain.  Has been going on for about a year..  Worse with running.  Some shoewear does exacerbate his symptoms.  History of surgery for Zuñiga fracture in 1998.  Denies any new trauma.  Works as a principal of a middle school.  Pain about the first MTP joint.  No previous treatment.    Subjective   Review of Systems: Review of Systems   Musculoskeletal:  Positive for arthralgias.   All other systems reviewed and are negative.     Past Medical History:   Past Medical History:   Diagnosis Date    Allergic     HTN (hypertension)     Hypogonadism male        Past Surgical History:   Past Surgical History:   Procedure Laterality Date    FOOT FRACTURE SURGERY      SHOULDER SURGERY  2014       Family History:   Family History   Problem Relation Age of Onset    Diabetes Mother     Hypertension Mother     Hypertension Father     Cancer Maternal Grandmother     Diabetes Maternal Grandmother     Cancer Maternal Grandfather     Hypertension Maternal Grandfather     Diabetes Maternal Grandfather     Heart disease Paternal Grandfather        Social History:   Social History     Socioeconomic History    Marital status:    Tobacco Use    Smoking status: Never    Smokeless tobacco: Current     Types: Snuff   Vaping Use    Vaping Use: Never used   Substance and Sexual Activity    Alcohol use: Not Currently     Comment: social    Drug use: Never    Sexual activity: Yes     Partners: Female       Medications:   Current Outpatient Medications:     Alcohol Swabs pads, Clean area prior to  "injection, Disp: 100 each, Rfl: 11    anastrozole (Arimidex) 1 MG tablet, Take 1 tablet by mouth 1 (One) Time Per Week., Disp: 12 tablet, Rfl: 3    buPROPion XL (WELLBUTRIN XL) 150 MG 24 hr tablet, TAKE ONE TABLET BY MOUTH DAILY, Disp: 90 tablet, Rfl: 3    carvedilol (COREG) 12.5 MG tablet, TAKE ONE TABLET BY MOUTH TWICE A DAY WITH MEALS, Disp: 180 tablet, Rfl: 4    fluticasone (FLONASE) 50 MCG/ACT nasal spray, SPRAY TWO SPRAYS IN EACH NOSTRIL ONCE DAILY, Disp: 16 mL, Rfl: 3    ibuprofen (ADVIL,MOTRIN) 800 MG tablet, Take  by mouth., Disp: , Rfl:     levocetirizine (XYZAL) 5 MG tablet, Take 1 tablet by mouth Every Evening., Disp: 90 tablet, Rfl: 3    losartan-hydrochlorothiazide (HYZAAR) 100-25 MG per tablet, TAKE ONE TABLET BY MOUTH DAILY, Disp: 90 tablet, Rfl: 3    montelukast (SINGULAIR) 10 MG tablet, TAKE ONE TABLET BY MOUTH DAILY, Disp: 90 tablet, Rfl: 3    Needle, Disp, (B-D DISP NEEDLE 25GX1\") 25G X 1\" misc, Use 1 device 2 times weekly for injection, Disp: 50 each, Rfl: 3    Needle, Disp, (BD Disp Needle) 23G X 1\" misc, Use to inject 1 time weekly, Disp: 50 each, Rfl: 11    Needle, Disp, 18G X 1-1/2\" misc, Use for drawing up the medication, Disp: 50 each, Rfl: 3    omeprazole (priLOSEC) 40 MG capsule, TAKE ONE CAPSULE BY MOUTH DAILY, Disp: 90 capsule, Rfl: 3    SUMAtriptan (IMITREX) 50 MG tablet, TAKE ONE TABLET BY MOUTH AT ONSET OF HEADACHE; MAY REPEAT ONE TABLET IN 2 HOURS IF HEADACHE NOT RELIEVED., Disp: 9 tablet, Rfl: 2    Syringe, Disposable, 3 ML misc, 1 syringe 1 (One) Time Per Week., Disp: 100 each, Rfl: 11    Testosterone Cypionate (DEPOTESTOTERONE CYPIONATE) 200 MG/ML injection, Inject 1 mL into the appropriate muscle as directed by prescriber Every 7 (Seven) Days., Disp: 4 mL, Rfl: 0    Allergies: No Known Allergies    I reviewed the patient's chief complaint, history of present illness, review of systems, past medical history, surgical history, family history, social history, medications and " "allergy list.     Objective    Vital Signs:   Vitals:    10/06/23 1031   BP: 118/72   Weight: 95.6 kg (210 lb 12.8 oz)   Height: 182.9 cm (72\")     Body mass index is 28.59 kg/m².    Ortho Exam:  right LE Foot and Ankle Exam:   Normal gait pattern. Hindfoot alignment is neutral. Plantigrade foot.   Neurological exam of the superficial peroneal, deep peroneal, plantar, sural and saphenous nerves demonstrates intact sensation and normal motor function.   There is good perfusion to the toes.   The skin is intact throughout the foot and ankle without ulceration.   Range of motion of ankle, subtalar joint, midfoot and toes is within normal limits.   There is minimal tenderness to palpation over prominence medial first MTP joint, visible bunion deformity, patient also has some pain with range of motion of the first MTP joint worse with terminal plantarflexion.    Results Review:   Imaging Results (Last 24 Hours)       Procedure Component Value Units Date/Time    XR Foot 3+ View Right [[id number]] Resulted: 10/06/23 1053     Updated: 10/06/23 1054    Narrative:      Right Foot X-Ray 10/06/23   Indication: Pain  Views: 3 weight bearing , comparison none  Findings: xrays reviewed by me today in the office and show, No fracture,   No bony lesion, Normal soft tissues, Normal joint spaces, demonstrates   hallux valgus with intermetatarsal angle of 13.5 degrees              Assessment / Plan    Assessment/Plan:   Diagnoses and all orders for this visit:    1. Right foot pain (Primary)  -     XR Foot 3+ View Right      Discussed right forefoot pain which has been present for over a year causing pain that has progressed (a chronic illness with exacerbation). Decision regarding surgical intervention considered. Patient is not a candidate due to trial of nonoperative management.  Discussed with patient that pain is worse with running.  Does have discomfort with first toe pressing against second toe.  Provided patient with information " on obtaining toe spacers.  Also discussed with patient some shoewear modifications and discussed different shoe stores in town where he can buy some shoes with rigid insoles and rocker-bottom's.  Also provided patient with information on carbon fiber insole which she can also get.  Did  patient was on wearing wider toebox shoes and avoiding dress shoes that are tapered.  Patient is try all of these conservative treatment modalities and return to clinic on as-needed basis symptoms persist or worsen.  Was a pleasure seeing him today.    Follow Up:   Return if symptoms worsen or fail to improve.      Eusebio Servin MD  [facility] [place] [facility]

## 2023-10-24 DIAGNOSIS — E29.1 HYPOGONADISM IN MALE: ICD-10-CM

## 2023-10-24 RX ORDER — TESTOSTERONE CYPIONATE 200 MG/ML
INJECTION, SOLUTION INTRAMUSCULAR
Qty: 4 ML | Refills: 0 | Status: SHIPPED | OUTPATIENT
Start: 2023-10-24

## 2023-10-27 ENCOUNTER — LAB (OUTPATIENT)
Dept: LAB | Facility: HOSPITAL | Age: 48
End: 2023-10-27
Payer: COMMERCIAL

## 2023-10-27 DIAGNOSIS — E29.1 HYPOGONADISM IN MALE: ICD-10-CM

## 2023-10-27 LAB
ESTRADIOL SERPL HS-MCNC: 8.8 PG/ML
HCT VFR BLD AUTO: 42.8 % (ref 37.5–51)
HGB BLD-MCNC: 14.9 G/DL (ref 13–17.7)
TESTOST SERPL-MCNC: 786 NG/DL (ref 249–836)

## 2023-10-27 PROCEDURE — 85018 HEMOGLOBIN: CPT

## 2023-10-27 PROCEDURE — 82670 ASSAY OF TOTAL ESTRADIOL: CPT

## 2023-10-27 PROCEDURE — 84403 ASSAY OF TOTAL TESTOSTERONE: CPT

## 2023-10-27 PROCEDURE — 36415 COLL VENOUS BLD VENIPUNCTURE: CPT

## 2023-10-27 PROCEDURE — 85014 HEMATOCRIT: CPT

## 2023-11-06 ENCOUNTER — OFFICE VISIT (OUTPATIENT)
Dept: INTERNAL MEDICINE | Facility: CLINIC | Age: 48
End: 2023-11-06
Payer: COMMERCIAL

## 2023-11-06 VITALS
DIASTOLIC BLOOD PRESSURE: 92 MMHG | OXYGEN SATURATION: 96 % | HEART RATE: 82 BPM | BODY MASS INDEX: 28.28 KG/M2 | HEIGHT: 72 IN | WEIGHT: 208.8 LBS | SYSTOLIC BLOOD PRESSURE: 149 MMHG | TEMPERATURE: 97.7 F

## 2023-11-06 DIAGNOSIS — E29.1 HYPOGONADISM IN MALE: ICD-10-CM

## 2023-11-06 DIAGNOSIS — I10 ESSENTIAL HYPERTENSION: Primary | ICD-10-CM

## 2023-11-06 DIAGNOSIS — H53.8 BLURRED VISION: ICD-10-CM

## 2023-11-06 DIAGNOSIS — Z00.00 ROUTINE GENERAL MEDICAL EXAMINATION AT A HEALTH CARE FACILITY: ICD-10-CM

## 2023-11-06 PROCEDURE — 99396 PREV VISIT EST AGE 40-64: CPT | Performed by: INTERNAL MEDICINE

## 2023-11-06 NOTE — PROGRESS NOTES
Chief Complaint   Patient presents with    Edema     Left knee.     Annual Exam       Jan Vyas is a 48 y.o. male and is here for a comprehensive physical exam. The patient reports problems - night sweats .     History:  Erectile dysfunction  no  Nocturia  no      Do you take any herbs or supplements that were not prescribed by a doctor? no    Are you taking aspirin daily? no      Health Habits:  Dental Exam. up to date  Eye Exam. up to date  Exercise: 5 times/week.  Current exercise activities include: cardiovascular workout on exercise equipment and weightlifting    Health Maintenance   Topic Date Due    COLORECTAL CANCER SCREENING  Never done    HEPATITIS C SCREENING  Never done    ANNUAL PHYSICAL  Never done    COVID-19 Vaccine (4 - 2023-24 season) 09/01/2023    LIPID PANEL  12/15/2023    BMI FOLLOWUP  08/17/2024    TDAP/TD VACCINES (2 - Td or Tdap) 01/25/2029    INFLUENZA VACCINE  Completed    Pneumococcal Vaccine 0-64  Aged Out       PMH, PSH, SocHx, FamHx, Allergies, and Medications: Reviewed and updated in the Visit Navigator.     No Known Allergies  Past Medical History:   Diagnosis Date    Allergic     HTN (hypertension)     Hypogonadism male      Past Surgical History:   Procedure Laterality Date    FOOT FRACTURE SURGERY      SHOULDER SURGERY  2014     Social History     Socioeconomic History    Marital status:    Tobacco Use    Smoking status: Never    Smokeless tobacco: Current     Types: Snuff   Vaping Use    Vaping Use: Never used   Substance and Sexual Activity    Alcohol use: Not Currently     Comment: social    Drug use: Never    Sexual activity: Yes     Partners: Female     Family History   Problem Relation Age of Onset    Diabetes Mother     Hypertension Mother     Hypertension Father     Cancer Maternal Grandmother     Diabetes Maternal Grandmother     Cancer Maternal Grandfather     Hypertension Maternal Grandfather     Diabetes Maternal Grandfather     Heart disease  "Paternal Grandfather        Review of Systems  Review of Systems   Constitutional: Negative.  Negative for activity change, appetite change, fatigue and fever.   HENT:  Negative for congestion, ear discharge, ear pain and trouble swallowing.    Eyes:  Negative for photophobia and visual disturbance.   Respiratory:  Negative for cough and shortness of breath.    Cardiovascular:  Negative for chest pain and palpitations.   Gastrointestinal:  Negative for abdominal distention, abdominal pain, constipation, diarrhea, nausea and vomiting.   Endocrine: Negative.    Genitourinary:  Negative for dysuria, hematuria and urgency.   Musculoskeletal:  Positive for arthralgias. Negative for back pain, joint swelling and myalgias.   Skin:  Negative for color change and rash.   Allergic/Immunologic: Negative.    Neurological:  Negative for dizziness, weakness, light-headedness and headaches.   Hematological:  Negative for adenopathy. Does not bruise/bleed easily.   Psychiatric/Behavioral:  Negative for agitation, confusion and dysphoric mood. The patient is not nervous/anxious.        Vitals:    11/06/23 1021   BP: 149/92   Pulse:    Temp:    SpO2:        Objective   /92   Pulse 82   Temp 97.7 °F (36.5 °C)   Ht 182.9 cm (72.01\")   Wt 94.7 kg (208 lb 12.8 oz)   SpO2 96%   BMI 28.31 kg/m²     Physical Exam  Constitutional:       General: He is not in acute distress.     Appearance: He is well-developed.   HENT:      Nose: Nose normal.   Eyes:      General: No scleral icterus.     Conjunctiva/sclera: Conjunctivae normal.   Neck:      Thyroid: No thyromegaly.      Trachea: No tracheal deviation.   Cardiovascular:      Rate and Rhythm: Normal rate and regular rhythm.      Heart sounds: No murmur heard.     No friction rub.   Pulmonary:      Effort: No respiratory distress.      Breath sounds: No wheezing or rales.   Abdominal:      General: There is no distension.      Palpations: Abdomen is soft. There is no mass.      " Tenderness: There is no abdominal tenderness. There is no guarding.   Musculoskeletal:         General: Deformity present. Normal range of motion.   Lymphadenopathy:      Cervical: No cervical adenopathy.   Skin:     General: Skin is warm and dry.      Findings: No erythema or rash.   Neurological:      Mental Status: He is alert and oriented to person, place, and time.      Cranial Nerves: No cranial nerve deficit.      Coordination: Coordination normal.      Deep Tendon Reflexes: Reflexes are normal and symmetric.   Psychiatric:         Behavior: Behavior normal.         Thought Content: Thought content normal.         Judgment: Judgment normal.       The 10-year CVD risk score (JEANNA'Agostino, et al., 2008) is: 10.7%    Values used to calculate the score:      Age: 48 years      Sex: Male      Diabetic: No      Tobacco smoker: No      Systolic Blood Pressure: 149 mmHg      Is BP treated: Yes      HDL Cholesterol: 37 mg/dL      Total Cholesterol: 137 mg/dL    Lab Results   Component Value Date    CHLPL 137 12/15/2022    TRIG 101 12/15/2022    HDL 37 (L) 12/15/2022    LDL 81 12/15/2022     Glucose   Date Value Ref Range Status   12/15/2022 98 65 - 99 mg/dL Final     BUN   Date Value Ref Range Status   12/15/2022 25 (H) 6 - 20 mg/dL Final     Creatinine   Date Value Ref Range Status   12/15/2022 1.19 0.76 - 1.27 mg/dL Final     Sodium   Date Value Ref Range Status   12/15/2022 138 136 - 145 mmol/L Final     Potassium   Date Value Ref Range Status   12/15/2022 4.1 3.5 - 5.2 mmol/L Final     Chloride   Date Value Ref Range Status   12/15/2022 97 (L) 98 - 107 mmol/L Final     Total CO2   Date Value Ref Range Status   12/15/2022 31.1 (H) 22.0 - 29.0 mmol/L Final     Calcium   Date Value Ref Range Status   12/15/2022 9.5 8.6 - 10.5 mg/dL Final     Total Protein   Date Value Ref Range Status   04/09/2015 8.0 6.3 - 8.2 g/dL Final     Albumin   Date Value Ref Range Status   12/15/2022 4.90 3.50 - 5.20 g/dL Final     ALT (SGPT)    Date Value Ref Range Status   12/15/2022 34 1 - 41 U/L Final     AST (SGOT)   Date Value Ref Range Status   12/15/2022 28 1 - 40 U/L Final     Alkaline Phosphatase   Date Value Ref Range Status   12/15/2022 66 39 - 117 U/L Final     Total Bilirubin   Date Value Ref Range Status   12/15/2022 0.7 0.0 - 1.2 mg/dL Final     eGFR Non  Am   Date Value Ref Range Status   03/24/2020 69 >60 mL/min/1.73 Final     A/G Ratio   Date Value Ref Range Status   12/15/2022 2.3 g/dL Final     BUN/Creatinine Ratio   Date Value Ref Range Status   12/15/2022 21.0 7.0 - 25.0 Final     Anion Gap   Date Value Ref Range Status   04/09/2015 16 10 - 20 mmol/L Final     Lab Results   Component Value Date    WBC 6.41 03/24/2020    HGB 14.9 10/27/2023    HCT 42.8 10/27/2023    MCV 88.0 03/24/2020     03/24/2020       Assessment & Plan   1. Healthy male exam.   2. Patient Counseling: Including but not Limited to the following, when appropriate:  --Nutrition: Stressed importance of moderation in sodium/caffeine intake, saturated fat and cholesterol, caloric balance, sufficient intake of fresh fruits, vegetables, fiber  .--Discussed the issue of daily use of baby aspirin.  --Exercise: Stressed the importance of regular exercise.   --Substance Abuse: Discussed cessation/primary prevention of tobacco, alcohol, or other drug use; driving or other dangerous activities under the influence; availability of treatment for abuse, as indicated based on social history.    --Sexuality: Discussed sexually transmitted diseases, partner selection, use of condoms and contraceptive alternatives.   --Injury prevention: Discussed safety belts, safety helmets, smoke detector, smoking near bedding or upholstery.   --Dental health: Discussed importance of regular tooth brushing, flossing, and dental visits.  --Immunizations reviewed.  --Discussed benefits of colon cancer screening.      3. Discussed the patient's BMI with him. BMI is >= 25 and <30.  (Overweight) The following options were offered after discussion;: nutrition counseling/recommendations  . The BMI is above average; BMI management plan is completed  4. No follow-ups on file.  5. Age-appropriate Screening Scheduled  6. There are no Patient Instructions on file for this visit.    Assessment & Plan     Diagnoses and all orders for this visit:    1. Essential hypertension (Primary) stable.  Stopping all alcohol has helped.  Doing well on carvedilol and losartan with HCTZ.  Recent weight loss noted    2. Hypogonadism in male stable following up with urology on testosterone supplement along with low-dose Arimidex    3. Routine general medical examination at a health care facility    4. Blurred vision has had a history of Lasix surgery followed by PRK.  Will benefit from evaluation by a cornea specialist  -     Ambulatory Referral for Diabetic Eye Exam-Ophthalmology

## 2023-11-08 ENCOUNTER — TELEPHONE (OUTPATIENT)
Dept: INTERNAL MEDICINE | Facility: CLINIC | Age: 48
End: 2023-11-08
Payer: COMMERCIAL

## 2023-11-08 ENCOUNTER — HOSPITAL ENCOUNTER (EMERGENCY)
Facility: HOSPITAL | Age: 48
Discharge: HOME | End: 2023-11-08
Attending: STUDENT IN AN ORGANIZED HEALTH CARE EDUCATION/TRAINING PROGRAM
Payer: COMMERCIAL

## 2023-11-08 VITALS
WEIGHT: 206.2 LBS | SYSTOLIC BLOOD PRESSURE: 159 MMHG | BODY MASS INDEX: 27.93 KG/M2 | DIASTOLIC BLOOD PRESSURE: 111 MMHG | TEMPERATURE: 99.2 F | HEART RATE: 86 BPM | HEIGHT: 72 IN | RESPIRATION RATE: 14 BRPM | OXYGEN SATURATION: 99 %

## 2023-11-08 DIAGNOSIS — K59.00 CONSTIPATION, UNSPECIFIED CONSTIPATION TYPE: Primary | ICD-10-CM

## 2023-11-08 PROCEDURE — 99282 EMERGENCY DEPT VISIT SF MDM: CPT

## 2023-11-08 NOTE — DISCHARGE INSTRUCTIONS
You presented to the emergency department with concerns for constipation.  We did a physical exam and you did not have tenderness.  We did shared decision-making regarding labs and further imaging versus a trial of outpatient escalation with Gisselle to see if his stool production would help with your symptoms and this is what we decided on.  We have sent that prescription to your pharmacy and you are now stable for discharge.  As we discussed, if you are having persistent nausea or vomiting or are having severe increase in abdominal pain, would recommend coming back to the emergency department for the imaging and lab work that we discussed.  You are now stable for discharge.

## 2023-11-08 NOTE — ED PROVIDER NOTES
Subjective:  History of Present Illness:    Patient is a 48-year-old male with history of hypertension, exogenous steroid use for hypogonadism who presents today with concerns for constipation.  Patient denies any abdominal pain, nausea, vomiting at this time but does state lower abdominal discomfort.  Has not stooled in the last 5 days.  Says that he is taking MiraLAX and mag citrate without relief.  No past history of any surgical interventions to his abdomen.  Denies any fevers.  No cough or congestion.  Has been tolerating p.o. to his baseline simply stating that he has not stooled and has discomfort in his abdomen.  No history of this in the past.  Unremarkable exam      Nurses Notes reviewed and agree, including vitals, allergies, social history and prior medical history.     REVIEW OF SYSTEMS: All systems reviewed and not pertinent unless noted.  Review of Systems   Constitutional:  Negative for activity change, appetite change, chills, fatigue and fever.   HENT:  Negative for congestion, sinus pressure, sneezing and trouble swallowing.    Eyes:  Negative for discharge and itching.   Respiratory:  Negative for cough and shortness of breath.    Cardiovascular:  Negative for chest pain and palpitations.   Gastrointestinal:  Positive for constipation. Negative for abdominal distention, abdominal pain, diarrhea, nausea and vomiting.   Endocrine: Negative for cold intolerance and heat intolerance.   Genitourinary:  Negative for decreased urine volume, dysuria and urgency.   Musculoskeletal:  Negative for gait problem, neck pain and neck stiffness.   Skin:  Negative for color change and rash.   Allergic/Immunologic: Negative for immunocompromised state.   Neurological:  Negative for facial asymmetry and headaches.   Hematological:  Negative for adenopathy.   Psychiatric/Behavioral:  Negative for self-injury and suicidal ideas.        Past Medical History:   Diagnosis Date    Allergic     HTN (hypertension)      "Hypogonadism male        Allergies:    Patient has no known allergies.      Past Surgical History:   Procedure Laterality Date    FOOT FRACTURE SURGERY      SHOULDER SURGERY  2014         Social History     Socioeconomic History    Marital status:    Tobacco Use    Smoking status: Never    Smokeless tobacco: Current     Types: Snuff   Vaping Use    Vaping Use: Never used   Substance and Sexual Activity    Alcohol use: Not Currently    Drug use: Never    Sexual activity: Yes     Partners: Female         Family History   Problem Relation Age of Onset    Diabetes Mother     Hypertension Mother     Hypertension Father     Cancer Maternal Grandmother     Diabetes Maternal Grandmother     Cancer Maternal Grandfather     Hypertension Maternal Grandfather     Diabetes Maternal Grandfather     Heart disease Paternal Grandfather        Objective  Physical Exam:  BP (!) 159/111 (BP Location: Left arm, Patient Position: Sitting)   Pulse 86   Temp 99.2 °F (37.3 °C) (Oral)   Resp 14   Ht 182.9 cm (72.01\")   Wt 93.5 kg (206 lb 3.2 oz)   SpO2 99%   BMI 27.96 kg/m²      Physical Exam  Constitutional:       General: He is not in acute distress.     Appearance: Normal appearance. He is normal weight. He is not ill-appearing.   HENT:      Head: Normocephalic and atraumatic.      Nose: Nose normal. No congestion or rhinorrhea.      Mouth/Throat:      Mouth: Mucous membranes are moist.      Pharynx: Oropharynx is clear.   Eyes:      Extraocular Movements: Extraocular movements intact.      Conjunctiva/sclera: Conjunctivae normal.      Pupils: Pupils are equal, round, and reactive to light.   Cardiovascular:      Rate and Rhythm: Normal rate and regular rhythm.      Pulses: Normal pulses.   Pulmonary:      Effort: Pulmonary effort is normal. No respiratory distress.      Breath sounds: Normal breath sounds.   Abdominal:      General: Abdomen is flat. Bowel sounds are normal. There is no distension.      Palpations: Abdomen " is soft.      Tenderness: There is no abdominal tenderness. There is no right CVA tenderness, left CVA tenderness, guarding or rebound.   Musculoskeletal:         General: No swelling or tenderness. Normal range of motion.      Cervical back: Normal range of motion and neck supple. No rigidity or tenderness.   Skin:     General: Skin is warm and dry.      Capillary Refill: Capillary refill takes less than 2 seconds.   Neurological:      General: No focal deficit present.      Mental Status: He is alert and oriented to person, place, and time. Mental status is at baseline.      Cranial Nerves: No cranial nerve deficit.      Sensory: No sensory deficit.      Motor: No weakness.   Psychiatric:         Mood and Affect: Mood normal.         Behavior: Behavior normal.         Thought Content: Thought content normal.         Judgment: Judgment normal.         Procedures    ED Course:         Lab Results (last 24 hours)       ** No results found for the last 24 hours. **             No radiology results from the last 24 hrs       MDM      Initial impression of presenting illness: Constipation    DDX: includes but is not limited to: Constipation, SBO, impaction    Patient arrives stable with vitals interpreted by myself.     Pertinent features from physical exam: Nontender to abdominal palpation at the time my exam, well-appearing.    Initial diagnostic plan: Shared decision making with patient regarding work-up versus trial of GoLytely as an outpatient.  Patient would prefer trial of GoLytely with no intervention at this time    Results from initial plan were reviewed and interpreted by me revealing see above    Diagnostic information from other sources: Reviewed past medical records    Interventions / Re-evaluation: Observed in emergency department for over 30 minutes with no change in vital signs    Results/clinical rationale were discussed with patient at bedside    Consultations/Discussion of results with other  physicians: Shared decision making with patient regarding imaging and work-up versus outpatient therapy.  Patient would like to try increased bowel regimen as an outpatient follow-up with primary care doctor.  Given this, given GoLytely and given strict turn precautions for any severe increase in abdominal pain and inability tolerate p.o.    Disposition plan: Discharge  -----        Final diagnoses:   Constipation, unspecified constipation type          Cuong Parker MD  11/08/23 2556

## 2023-11-08 NOTE — TELEPHONE ENCOUNTER
Patient contacted to discuss constipation. States that he has tried a fleets enema, magnesium citrate, miralax and stool softeners. Has had diarrhea but does not feel like he is empty. Is bloated and is having some abdominal pain. I advised patient to proceed to the ED for evaluation due to the pain and bloating. Patient is agreeable to this and will call to schedule an appointment if needed.

## 2023-11-08 NOTE — Clinical Note
[facility] EMERGENCY DEPARTMENT  [address] [id number]  Phone: [phone]    Jan Vyas was seen and treated in our emergency department on 11/8/2023.  He may return to work on 11/10/2023.         Thank you for choosing [facility].    Cuong Parker MD

## 2023-11-15 ENCOUNTER — OFFICE VISIT (OUTPATIENT)
Dept: GASTROENTEROLOGY | Facility: CLINIC | Age: 48
End: 2023-11-15
Payer: COMMERCIAL

## 2023-11-15 VITALS
SYSTOLIC BLOOD PRESSURE: 180 MMHG | OXYGEN SATURATION: 98 % | WEIGHT: 207 LBS | DIASTOLIC BLOOD PRESSURE: 100 MMHG | BODY MASS INDEX: 28.07 KG/M2 | HEART RATE: 68 BPM

## 2023-11-15 DIAGNOSIS — K59.00 CONSTIPATION, UNSPECIFIED CONSTIPATION TYPE: ICD-10-CM

## 2023-11-15 DIAGNOSIS — Z12.11 COLON CANCER SCREENING: Primary | ICD-10-CM

## 2023-11-15 NOTE — H&P (VIEW-ONLY)
New Patient Consult      Date: 11/15/2023   Patient Name: Jan Vyas  MRN: [id number]  : 1975     Referring Physician: Lázaro Hadley MD    Chief Complaint   Patient presents with    Colon Cancer Screening       History of Present Illness: Jan Vyas is a 48 y.o. male who is here today to establish care with Gastroenterology.    He has generally been very regular with his bowels, however most recently, he was on antibiotics following which he became constipated for several days.  Had to take over-the-counter laxatives to have a bowel movement.  Because this was unusual for him, he even went to the emergency room to be evaluated.  Exam was nontender, and while there was no imaging performed, he was told to follow-up as an outpatient and return if symptoms persisted or worsen.  He was given some GoLytely to drink and he did have bowel movements after that.    He has since been feeling better but is concerned that he still needs over-the-counter laxatives, and feels somewhat bloated.  Upon further history there has been a subtle change in his diet.  He tries to aim for close to 200 g of protein which is lean and generally healthy, however his fiber intake may have dropped over the past couple of weeks.  He still gets fresh berries and at least a cup of oatmeal daily.    The other recent change has been to his medication regimen.  He was started on Arimidex.  Was wondering if it could be contributing to the bowel abnormalities.    At home, he had also recently installed a reverse osmosis system, and while he drinks copious amounts of water daily he was wondering if the lack of electrolytes/minerals in the reverse osmosis water may be contributing to the constipation.    Subjective      Past Medical History:   Diagnosis Date    Allergic     HTN (hypertension)     Hypogonadism male        Past Surgical History:   Procedure Laterality Date    FOOT FRACTURE SURGERY      SHOULDER SURGERY   "2014       Family History   Problem Relation Age of Onset    Diabetes Mother     Hypertension Mother     Hypertension Father     Cancer Maternal Grandmother     Diabetes Maternal Grandmother     Cancer Maternal Grandfather     Hypertension Maternal Grandfather     Diabetes Maternal Grandfather     Heart disease Paternal Grandfather        Social History     Socioeconomic History    Marital status:    Tobacco Use    Smoking status: Never    Smokeless tobacco: Current     Types: Snuff   Vaping Use    Vaping Use: Never used   Substance and Sexual Activity    Alcohol use: Not Currently    Drug use: Never    Sexual activity: Yes     Partners: Female         Current Outpatient Medications:     Alcohol Swabs pads, Clean area prior to injection, Disp: 100 each, Rfl: 11    anastrozole (Arimidex) 1 MG tablet, Take 1 tablet by mouth 1 (One) Time Per Week., Disp: 12 tablet, Rfl: 3    carvedilol (COREG) 12.5 MG tablet, TAKE ONE TABLET BY MOUTH TWICE A DAY WITH MEALS, Disp: 180 tablet, Rfl: 4    fluticasone (FLONASE) 50 MCG/ACT nasal spray, SPRAY TWO SPRAYS IN EACH NOSTRIL ONCE DAILY, Disp: 16 mL, Rfl: 3    ibuprofen (ADVIL,MOTRIN) 800 MG tablet, Take  by mouth., Disp: , Rfl:     levocetirizine (XYZAL) 5 MG tablet, Take 1 tablet by mouth Every Evening., Disp: 90 tablet, Rfl: 3    losartan-hydrochlorothiazide (HYZAAR) 100-25 MG per tablet, TAKE ONE TABLET BY MOUTH DAILY, Disp: 90 tablet, Rfl: 3    montelukast (SINGULAIR) 10 MG tablet, TAKE ONE TABLET BY MOUTH DAILY, Disp: 90 tablet, Rfl: 3    Needle, Disp, (B-D DISP NEEDLE 25GX1\") 25G X 1\" misc, Use 1 device 2 times weekly for injection, Disp: 50 each, Rfl: 3    Needle, Disp, (BD Disp Needle) 23G X 1\" misc, Use to inject 1 time weekly, Disp: 50 each, Rfl: 11    Needle, Disp, 18G X 1-1/2\" misc, Use for drawing up the medication, Disp: 50 each, Rfl: 3    omeprazole (priLOSEC) 40 MG capsule, TAKE ONE CAPSULE BY MOUTH DAILY, Disp: 90 capsule, Rfl: 3    SUMAtriptan (IMITREX) 50 " MG tablet, TAKE ONE TABLET BY MOUTH AT ONSET OF HEADACHE; MAY REPEAT ONE TABLET IN 2 HOURS IF HEADACHE NOT RELIEVED., Disp: 9 tablet, Rfl: 2    Syringe, Disposable, 3 ML misc, 1 syringe 1 (One) Time Per Week., Disp: 100 each, Rfl: 11    Testosterone Cypionate (DEPOTESTOTERONE CYPIONATE) 200 MG/ML injection, INJECT 1 MILLILITER INTRAMUSCULARLY ONCE WEEKLY, Disp: 4 mL, Rfl: 0    No Known Allergies    Review of Systems   Constitutional:  Negative for unexpected weight loss.   HENT:  Negative for trouble swallowing.    Gastrointestinal:  Positive for abdominal pain. Negative for abdominal distention, anal bleeding, blood in stool, constipation, diarrhea, nausea, rectal pain, vomiting, GERD and indigestion.       The following portions of the patient's history were reviewed and updated as appropriate: allergies, current medications, past family history, past medical history, past social history, past surgical history and problem list.    Objective     Physical Exam:  Vitals:    11/15/23 1129   BP: 180/100   Pulse: 68   SpO2: 98%   Weight: 93.9 kg (207 lb)       Physical Exam  Constitutional:       General: He is not in acute distress.     Appearance: Normal appearance. He is not ill-appearing.   HENT:      Head: Normocephalic and atraumatic.   Eyes:      General: No scleral icterus.     Conjunctiva/sclera: Conjunctivae normal.   Cardiovascular:      Rate and Rhythm: Normal rate.      Heart sounds: Normal heart sounds.   Pulmonary:      Effort: Pulmonary effort is normal. No respiratory distress.      Breath sounds: Normal breath sounds.   Abdominal:      General: There is no distension.      Palpations: Abdomen is soft.      Tenderness: There is no abdominal tenderness. There is no guarding.   Musculoskeletal:         General: No swelling or tenderness.      Cervical back: Neck supple. No rigidity.   Skin:     General: Skin is warm and dry.      Capillary Refill: Capillary refill takes less than 2 seconds.       Coloration: Skin is not jaundiced or pale.   Neurological:      General: No focal deficit present.      Mental Status: He is alert and oriented to person, place, and time. Mental status is at baseline.   Psychiatric:         Mood and Affect: Mood normal.         Behavior: Behavior normal.         Results Review:   I have reviewed the patient's new clinical and imaging results and agree with the interpretation.     Lab on 10/27/2023   Component Date Value Ref Range Status    Estradiol 10/27/2023 8.8  pg/mL Final    Hemoglobin 10/27/2023 14.9  13.0 - 17.7 g/dL Final    Hematocrit 10/27/2023 42.8  37.5 - 51.0 % Final    Testosterone, Total 10/27/2023 786.00  249.00 - 836.00 ng/dL Final      No radiology results for the last 90 days.    Assessment / Plan      Assessment & Plan:  Diagnoses and all orders for this visit:    1. Colon cancer screening (Primary)  -     Case Request; Standing  -     Case Request  -     polyethylene glycol (GoLYTELY) 236 g solution; Take 4,000 mL by mouth 1 (One) Time for 1 dose. Please see prep instructions from office.  Dispense: 4000 mL; Refill: 0    2. Constipation, unspecified constipation type    Other orders  -     Follow Anesthesia Guidelines / Protocol; Standing  -     Follow Anesthesia Guidelines / Protocol; Future  -     Obtain Informed Consent; Future  -     Verify NPO; Standing        He has not had a colonoscopy in the past.  He is agreeable to proceeding with a colonoscopy for colon cancer screening predominantly.    He has had some issues with constipation lately but there is no blood per rectum.  No abdominal pain, and there have been several subtle changes to his lifestyle as mentioned in the HPI above which could be contributing to the constipation.    It is possible that the antibiotic use may have upset his gut microbiome.  There is also been a comparative lack of fiber, especially given his high protein intake.    Would like him to use Florajen after the bowel prep and  colonoscopy.    We also discussed the addition of some sort of daily fiber supplement so that it is easier for him to track his macros.  Alternatively he will consider reintroducing some green vegetables into his diet.      Follow Up:   Return for Follow-up 3 months post procedure.    Irina Wright MD  [facility]    11/16/2023  09:40 EST    Part of this note may be an electronic transcription/translation of spoken language to printed text using the Dragon Dictation System.

## 2023-11-15 NOTE — PROGRESS NOTES
New Patient Consult      Date: 11/15/2023   Patient Name: Jan Vyas  MRN: [id number]  : 1975     Referring Physician: Lázaro Hadley MD    Chief Complaint   Patient presents with    Colon Cancer Screening       History of Present Illness: Jan Vyas is a 48 y.o. male who is here today to establish care with Gastroenterology.    He has generally been very regular with his bowels, however most recently, he was on antibiotics following which he became constipated for several days.  Had to take over-the-counter laxatives to have a bowel movement.  Because this was unusual for him, he even went to the emergency room to be evaluated.  Exam was nontender, and while there was no imaging performed, he was told to follow-up as an outpatient and return if symptoms persisted or worsen.  He was given some GoLytely to drink and he did have bowel movements after that.    He has since been feeling better but is concerned that he still needs over-the-counter laxatives, and feels somewhat bloated.  Upon further history there has been a subtle change in his diet.  He tries to aim for close to 200 g of protein which is lean and generally healthy, however his fiber intake may have dropped over the past couple of weeks.  He still gets fresh berries and at least a cup of oatmeal daily.    The other recent change has been to his medication regimen.  He was started on Arimidex.  Was wondering if it could be contributing to the bowel abnormalities.    At home, he had also recently installed a reverse osmosis system, and while he drinks copious amounts of water daily he was wondering if the lack of electrolytes/minerals in the reverse osmosis water may be contributing to the constipation.    Subjective      Past Medical History:   Diagnosis Date    Allergic     HTN (hypertension)     Hypogonadism male        Past Surgical History:   Procedure Laterality Date    FOOT FRACTURE SURGERY      SHOULDER SURGERY   "2014       Family History   Problem Relation Age of Onset    Diabetes Mother     Hypertension Mother     Hypertension Father     Cancer Maternal Grandmother     Diabetes Maternal Grandmother     Cancer Maternal Grandfather     Hypertension Maternal Grandfather     Diabetes Maternal Grandfather     Heart disease Paternal Grandfather        Social History     Socioeconomic History    Marital status:    Tobacco Use    Smoking status: Never    Smokeless tobacco: Current     Types: Snuff   Vaping Use    Vaping Use: Never used   Substance and Sexual Activity    Alcohol use: Not Currently    Drug use: Never    Sexual activity: Yes     Partners: Female         Current Outpatient Medications:     Alcohol Swabs pads, Clean area prior to injection, Disp: 100 each, Rfl: 11    anastrozole (Arimidex) 1 MG tablet, Take 1 tablet by mouth 1 (One) Time Per Week., Disp: 12 tablet, Rfl: 3    carvedilol (COREG) 12.5 MG tablet, TAKE ONE TABLET BY MOUTH TWICE A DAY WITH MEALS, Disp: 180 tablet, Rfl: 4    fluticasone (FLONASE) 50 MCG/ACT nasal spray, SPRAY TWO SPRAYS IN EACH NOSTRIL ONCE DAILY, Disp: 16 mL, Rfl: 3    ibuprofen (ADVIL,MOTRIN) 800 MG tablet, Take  by mouth., Disp: , Rfl:     levocetirizine (XYZAL) 5 MG tablet, Take 1 tablet by mouth Every Evening., Disp: 90 tablet, Rfl: 3    losartan-hydrochlorothiazide (HYZAAR) 100-25 MG per tablet, TAKE ONE TABLET BY MOUTH DAILY, Disp: 90 tablet, Rfl: 3    montelukast (SINGULAIR) 10 MG tablet, TAKE ONE TABLET BY MOUTH DAILY, Disp: 90 tablet, Rfl: 3    Needle, Disp, (B-D DISP NEEDLE 25GX1\") 25G X 1\" misc, Use 1 device 2 times weekly for injection, Disp: 50 each, Rfl: 3    Needle, Disp, (BD Disp Needle) 23G X 1\" misc, Use to inject 1 time weekly, Disp: 50 each, Rfl: 11    Needle, Disp, 18G X 1-1/2\" misc, Use for drawing up the medication, Disp: 50 each, Rfl: 3    omeprazole (priLOSEC) 40 MG capsule, TAKE ONE CAPSULE BY MOUTH DAILY, Disp: 90 capsule, Rfl: 3    SUMAtriptan (IMITREX) 50 " MG tablet, TAKE ONE TABLET BY MOUTH AT ONSET OF HEADACHE; MAY REPEAT ONE TABLET IN 2 HOURS IF HEADACHE NOT RELIEVED., Disp: 9 tablet, Rfl: 2    Syringe, Disposable, 3 ML misc, 1 syringe 1 (One) Time Per Week., Disp: 100 each, Rfl: 11    Testosterone Cypionate (DEPOTESTOTERONE CYPIONATE) 200 MG/ML injection, INJECT 1 MILLILITER INTRAMUSCULARLY ONCE WEEKLY, Disp: 4 mL, Rfl: 0    No Known Allergies    Review of Systems   Constitutional:  Negative for unexpected weight loss.   HENT:  Negative for trouble swallowing.    Gastrointestinal:  Positive for abdominal pain. Negative for abdominal distention, anal bleeding, blood in stool, constipation, diarrhea, nausea, rectal pain, vomiting, GERD and indigestion.       The following portions of the patient's history were reviewed and updated as appropriate: allergies, current medications, past family history, past medical history, past social history, past surgical history and problem list.    Objective     Physical Exam:  Vitals:    11/15/23 1129   BP: 180/100   Pulse: 68   SpO2: 98%   Weight: 93.9 kg (207 lb)       Physical Exam  Constitutional:       General: He is not in acute distress.     Appearance: Normal appearance. He is not ill-appearing.   HENT:      Head: Normocephalic and atraumatic.   Eyes:      General: No scleral icterus.     Conjunctiva/sclera: Conjunctivae normal.   Cardiovascular:      Rate and Rhythm: Normal rate.      Heart sounds: Normal heart sounds.   Pulmonary:      Effort: Pulmonary effort is normal. No respiratory distress.      Breath sounds: Normal breath sounds.   Abdominal:      General: There is no distension.      Palpations: Abdomen is soft.      Tenderness: There is no abdominal tenderness. There is no guarding.   Musculoskeletal:         General: No swelling or tenderness.      Cervical back: Neck supple. No rigidity.   Skin:     General: Skin is warm and dry.      Capillary Refill: Capillary refill takes less than 2 seconds.       Coloration: Skin is not jaundiced or pale.   Neurological:      General: No focal deficit present.      Mental Status: He is alert and oriented to person, place, and time. Mental status is at baseline.   Psychiatric:         Mood and Affect: Mood normal.         Behavior: Behavior normal.         Results Review:   I have reviewed the patient's new clinical and imaging results and agree with the interpretation.     Lab on 10/27/2023   Component Date Value Ref Range Status    Estradiol 10/27/2023 8.8  pg/mL Final    Hemoglobin 10/27/2023 14.9  13.0 - 17.7 g/dL Final    Hematocrit 10/27/2023 42.8  37.5 - 51.0 % Final    Testosterone, Total 10/27/2023 786.00  249.00 - 836.00 ng/dL Final      No radiology results for the last 90 days.    Assessment / Plan      Assessment & Plan:  Diagnoses and all orders for this visit:    1. Colon cancer screening (Primary)  -     Case Request; Standing  -     Case Request  -     polyethylene glycol (GoLYTELY) 236 g solution; Take 4,000 mL by mouth 1 (One) Time for 1 dose. Please see prep instructions from office.  Dispense: 4000 mL; Refill: 0    2. Constipation, unspecified constipation type    Other orders  -     Follow Anesthesia Guidelines / Protocol; Standing  -     Follow Anesthesia Guidelines / Protocol; Future  -     Obtain Informed Consent; Future  -     Verify NPO; Standing        He has not had a colonoscopy in the past.  He is agreeable to proceeding with a colonoscopy for colon cancer screening predominantly.    He has had some issues with constipation lately but there is no blood per rectum.  No abdominal pain, and there have been several subtle changes to his lifestyle as mentioned in the HPI above which could be contributing to the constipation.    It is possible that the antibiotic use may have upset his gut microbiome.  There is also been a comparative lack of fiber, especially given his high protein intake.    Would like him to use Florajen after the bowel prep and  colonoscopy.    We also discussed the addition of some sort of daily fiber supplement so that it is easier for him to track his macros.  Alternatively he will consider reintroducing some green vegetables into his diet.      Follow Up:   Return for Follow-up 3 months post procedure.    Irina Wright MD  [facility]    11/16/2023  09:40 EST    Part of this note may be an electronic transcription/translation of spoken language to printed text using the Dragon Dictation System.

## 2023-11-16 NOTE — PRE-PROCEDURE INSTRUCTIONS
PAT phone history completed with pt for upcoming procedure on 11/17/23, with Dr. Wright.     PAT PASS GIVEN/REVIEWED WITH PT.  VERBALIZED UNDERSTANDING OF THE FOLLOWING:  DO NOT EAT, DRINK, SMOKE, USE SMOKELESS TOBACCO OR CHEW GUM AFTER MIDNIGHT THE NIGHT BEFORE SURGERY.  THIS ALSO INCLUDES HARD CANDIES AND MINTS.    DO NOT SHAVE THE AREA TO BE OPERATED ON AT LEAST 48 HOURS PRIOR TO THE PROCEDURE.  DO NOT WEAR MAKE UP OR NAIL POLISH.  DO NOT LEAVE IN ANY PIERCING OR WEAR JEWELRY THE DAY OF SURGERY.      DO NOT USE ADHESIVES IF YOU WEAR DENTURES.    DO NOT WEAR EYE CONTACTS; BRING IN YOUR GLASSES.    ONLY TAKE MEDICATION THE MORNING OF YOUR PROCEDURE IF INSTRUCTED BY YOUR SURGEON WITH ENOUGH WATER TO SWALLOW THE MEDICATION.  IF YOUR SURGEON DID NOT SPECIFY WHICH MEDICATIONS TO TAKE, YOU WILL NEED TO CALL THEIR OFFICE FOR FURTHER INSTRUCTIONS AND DO AS THEY INSTRUCT.    LEAVE ANYTHING YOU CONSIDER VALUABLE AT HOME.    YOU WILL NEED TO ARRANGE FOR SOMEONE TO DRIVE YOU HOME AFTER SURGERY.  IT IS RECOMMENDED THAT YOU DO NOT DRIVE, WORK, DRINK ALCOHOL OR MAKE MAJOR DECISIONS FOR AT LEAST 24 HOURS AFTER YOUR PROCEDURE IS COMPLETE.      THE DAY OF YOUR PROCEDURE, BRING IN THE FOLLOWING IF APPLICABLE:   PICTURE ID AND INSURANCE/MEDICARE OR MEDICAID CARDS/ANY CO-PAY THAT MAY BE DUE   COPY OF ADVANCED DIRECTIVE/LIVING WILL/POWER OR    CPAP/BIPAP/INHALERS   SKIN PREP SHEET   YOUR PREADMISSION TESTING PASS (IF NOT A PHONE HISTORY)    Medication instructions given to pt by RN per anesthesia protocol.  Pt referred back to surgeon for further instructions if he/she is on any blood thinners.

## 2023-11-17 ENCOUNTER — ANESTHESIA EVENT (OUTPATIENT)
Dept: GASTROENTEROLOGY | Facility: HOSPITAL | Age: 48
End: 2023-11-17
Payer: COMMERCIAL

## 2023-11-17 ENCOUNTER — HOSPITAL ENCOUNTER (OUTPATIENT)
Facility: HOSPITAL | Age: 48
Setting detail: HOSPITAL OUTPATIENT SURGERY
Discharge: HOME | End: 2023-11-17
Attending: INTERNAL MEDICINE | Admitting: INTERNAL MEDICINE
Payer: COMMERCIAL

## 2023-11-17 ENCOUNTER — ANESTHESIA (OUTPATIENT)
Dept: GASTROENTEROLOGY | Facility: HOSPITAL | Age: 48
End: 2023-11-17
Payer: COMMERCIAL

## 2023-11-17 VITALS
RESPIRATION RATE: 16 BRPM | HEART RATE: 73 BPM | WEIGHT: 200 LBS | SYSTOLIC BLOOD PRESSURE: 125 MMHG | BODY MASS INDEX: 27.09 KG/M2 | DIASTOLIC BLOOD PRESSURE: 95 MMHG | HEIGHT: 72 IN | TEMPERATURE: 97.5 F | OXYGEN SATURATION: 98 %

## 2023-11-17 DIAGNOSIS — Z12.11 COLON CANCER SCREENING: ICD-10-CM

## 2023-11-17 PROCEDURE — 25010000002 PROPOFOL 10 MG/ML EMULSION: Performed by: NURSE ANESTHETIST, CERTIFIED REGISTERED

## 2023-11-17 PROCEDURE — 25810000003 LACTATED RINGERS PER 1000 ML: Performed by: NURSE ANESTHETIST, CERTIFIED REGISTERED

## 2023-11-17 PROCEDURE — 45380 COLONOSCOPY AND BIOPSY: CPT | Performed by: INTERNAL MEDICINE

## 2023-11-17 PROCEDURE — 25010000002 MIDAZOLAM PER 1MG: Performed by: NURSE ANESTHETIST, CERTIFIED REGISTERED

## 2023-11-17 PROCEDURE — 25810000003 LACTATED RINGERS PER 1000 ML: Performed by: INTERNAL MEDICINE

## 2023-11-17 PROCEDURE — 25010000002 FENTANYL CITRATE (PF) 50 MCG/ML SOLUTION: Performed by: NURSE ANESTHETIST, CERTIFIED REGISTERED

## 2023-11-17 PROCEDURE — 25010000002 HALOPERIDOL LACTATE PER 5 MG: Performed by: NURSE ANESTHETIST, CERTIFIED REGISTERED

## 2023-11-17 RX ORDER — SODIUM CHLORIDE, SODIUM LACTATE, POTASSIUM CHLORIDE, CALCIUM CHLORIDE 600; 310; 30; 20 MG/100ML; MG/100ML; MG/100ML; MG/100ML
INJECTION, SOLUTION INTRAVENOUS CONTINUOUS PRN
Status: DISCONTINUED | OUTPATIENT
Start: 2023-11-17 | End: 2023-11-17 | Stop reason: SURG

## 2023-11-17 RX ORDER — KETAMINE HCL IN NACL, ISO-OSM 100MG/10ML
SYRINGE (ML) INJECTION AS NEEDED
Status: DISCONTINUED | OUTPATIENT
Start: 2023-11-17 | End: 2023-11-17 | Stop reason: SURG

## 2023-11-17 RX ORDER — PROPOFOL 10 MG/ML
VIAL (ML) INTRAVENOUS AS NEEDED
Status: DISCONTINUED | OUTPATIENT
Start: 2023-11-17 | End: 2023-11-17 | Stop reason: SURG

## 2023-11-17 RX ORDER — SODIUM CHLORIDE, SODIUM LACTATE, POTASSIUM CHLORIDE, CALCIUM CHLORIDE 600; 310; 30; 20 MG/100ML; MG/100ML; MG/100ML; MG/100ML
1000 INJECTION, SOLUTION INTRAVENOUS CONTINUOUS
Status: DISCONTINUED | OUTPATIENT
Start: 2023-11-17 | End: 2023-11-17 | Stop reason: HOSPADM

## 2023-11-17 RX ORDER — MIDAZOLAM HYDROCHLORIDE 2 MG/2ML
INJECTION, SOLUTION INTRAMUSCULAR; INTRAVENOUS AS NEEDED
Status: DISCONTINUED | OUTPATIENT
Start: 2023-11-17 | End: 2023-11-17 | Stop reason: SURG

## 2023-11-17 RX ORDER — FENTANYL CITRATE 50 UG/ML
INJECTION, SOLUTION INTRAMUSCULAR; INTRAVENOUS AS NEEDED
Status: DISCONTINUED | OUTPATIENT
Start: 2023-11-17 | End: 2023-11-17 | Stop reason: SURG

## 2023-11-17 RX ORDER — HALOPERIDOL 5 MG/ML
INJECTION INTRAMUSCULAR AS NEEDED
Status: DISCONTINUED | OUTPATIENT
Start: 2023-11-17 | End: 2023-11-17 | Stop reason: SURG

## 2023-11-17 RX ADMIN — PROPOFOL 30 MG: 10 INJECTION, EMULSION INTRAVENOUS at 14:18

## 2023-11-17 RX ADMIN — HALOPERIDOL LACTATE 0.5 MG: 5 INJECTION, SOLUTION INTRAMUSCULAR at 14:02

## 2023-11-17 RX ADMIN — PROPOFOL 30 MG: 10 INJECTION, EMULSION INTRAVENOUS at 14:21

## 2023-11-17 RX ADMIN — MIDAZOLAM HYDROCHLORIDE 2 MG: 1 INJECTION, SOLUTION INTRAMUSCULAR; INTRAVENOUS at 14:01

## 2023-11-17 RX ADMIN — PROPOFOL 50 MG: 10 INJECTION, EMULSION INTRAVENOUS at 14:06

## 2023-11-17 RX ADMIN — PROPOFOL 30 MG: 10 INJECTION, EMULSION INTRAVENOUS at 14:09

## 2023-11-17 RX ADMIN — Medication 25 MG: at 14:06

## 2023-11-17 RX ADMIN — FENTANYL CITRATE 50 MCG: 50 INJECTION, SOLUTION INTRAMUSCULAR; INTRAVENOUS at 14:02

## 2023-11-17 RX ADMIN — PROPOFOL 50 MG: 10 INJECTION, EMULSION INTRAVENOUS at 14:04

## 2023-11-17 RX ADMIN — SODIUM CHLORIDE, POTASSIUM CHLORIDE, SODIUM LACTATE AND CALCIUM CHLORIDE 1000 ML: 600; 310; 30; 20 INJECTION, SOLUTION INTRAVENOUS at 13:19

## 2023-11-17 RX ADMIN — Medication 25 MG: at 14:01

## 2023-11-17 RX ADMIN — SODIUM CHLORIDE, POTASSIUM CHLORIDE, SODIUM LACTATE AND CALCIUM CHLORIDE: 600; 310; 30; 20 INJECTION, SOLUTION INTRAVENOUS at 12:01

## 2023-11-17 RX ADMIN — PROPOFOL 30 MG: 10 INJECTION, EMULSION INTRAVENOUS at 14:12

## 2023-11-17 RX ADMIN — PROPOFOL 30 MG: 10 INJECTION, EMULSION INTRAVENOUS at 14:15

## 2023-11-17 NOTE — ANESTHESIA POSTPROCEDURE EVALUATION
Patient: Jan Vyas    Procedure Summary       Date: 11/17/23 Room / Location: [facility] ENDOSCOPY 1 / [facility] ENDOSCOPY    Anesthesia Start: 1400 Anesthesia Stop:     Procedure: COLONOSCOPY with polypectomy and biopsy (Anus) Diagnosis:       Colon cancer screening      (Colon cancer screening [Z12.11])    Surgeons: Irina Wright MD Provider:     Anesthesia Type: MAC ASA Status: 2            Anesthesia Type: MAC    Vitals  No vitals data found for the desired time range.          Post Anesthesia Care and Evaluation    Patient location during evaluation: PHASE II  Patient participation: complete - patient participated  Level of consciousness: awake  Pain score: 0  Pain management: adequate    Airway patency: patent  Anesthetic complications: No anesthetic complications  PONV Status: none  Cardiovascular status: acceptable  Respiratory status: acceptable  Hydration status: acceptable    Comments: See R.N. note for postop vital signs.vsss resp spont, reflexes intact, responsive, report given to pacu nurse

## 2023-11-17 NOTE — ANESTHESIA PREPROCEDURE EVALUATION
Anesthesia Evaluation     Patient summary reviewed and Nursing notes reviewed   no history of anesthetic complications:   NPO Solid Status: > 8 hours  NPO Liquid Status: > 8 hours           Airway   Mallampati: II  TM distance: >3 FB  Neck ROM: full  Possible difficult intubation and No difficulty expected  Dental      Pulmonary    (-) not a smoker  Cardiovascular     (+) hypertension      Neuro/Psych  (+) headaches, dizziness/light headedness, psychiatric history  GI/Hepatic/Renal/Endo      Musculoskeletal     Abdominal    Substance History      OB/GYN          Other        ROS/Med Hx Other: Labs sreviewed                 Anesthesia Plan    ASA 2     MAC     (Risks and benefits discussed including risk of aspiration, recall and dental damage. All patient questions answered.    Will continue with plan of care.)  intravenous induction     Anesthetic plan, risks, benefits, and alternatives have been provided, discussed and informed consent has been obtained with: patient.  Pre-procedure education provided    CODE STATUS:

## 2023-11-22 ENCOUNTER — TELEPHONE (OUTPATIENT)
Dept: GASTROENTEROLOGY | Facility: CLINIC | Age: 48
End: 2023-11-22
Payer: COMMERCIAL

## 2023-11-22 LAB — REF LAB TEST METHOD: NORMAL

## 2023-11-22 NOTE — TELEPHONE ENCOUNTER
----- Message from Alejandra Ferguson MA sent at 11/22/2023 11:36 AM EST -----    ----- Message -----  From: Irina Wright MD  Sent: 11/22/2023  11:32 AM EST  To: [facility]    Please give the patient the following message:  ----- Results -----  The resected colon polyp was noted to be a lymphoid aggregate on pathology.  This is a harmless finding.  This is not a true polyp.  The sigmoid colon biopsies are negative for colitis.

## 2023-11-22 NOTE — PROGRESS NOTES
Please give the patient the following message:  ----- Results -----  The resected colon polyp was noted to be a lymphoid aggregate on pathology.  This is a harmless finding.  This is not a true polyp.  The sigmoid colon biopsies are negative for colitis.

## 2023-12-09 DIAGNOSIS — E29.1 HYPOGONADISM IN MALE: ICD-10-CM

## 2023-12-09 RX ORDER — CARVEDILOL 12.5 MG/1
TABLET ORAL
Qty: 180 TABLET | Refills: 3 | Status: SHIPPED | OUTPATIENT
Start: 2023-12-09

## 2023-12-09 RX ORDER — FLUTICASONE PROPIONATE 50 MCG
SPRAY, SUSPENSION (ML) NASAL
Qty: 16 ML | Refills: 3 | Status: SHIPPED | OUTPATIENT
Start: 2023-12-09

## 2023-12-09 RX ORDER — OMEPRAZOLE 40 MG/1
CAPSULE, DELAYED RELEASE ORAL
Qty: 90 CAPSULE | Refills: 3 | Status: SHIPPED | OUTPATIENT
Start: 2023-12-09

## 2023-12-11 RX ORDER — TESTOSTERONE CYPIONATE 200 MG/ML
200 INJECTION, SOLUTION INTRAMUSCULAR
Qty: 4 ML | Refills: 0 | Status: SHIPPED | OUTPATIENT
Start: 2023-12-11

## 2023-12-12 ENCOUNTER — OFFICE VISIT (OUTPATIENT)
Dept: UROLOGY | Facility: CLINIC | Age: 48
End: 2023-12-12
Payer: COMMERCIAL

## 2023-12-12 VITALS
WEIGHT: 200 LBS | DIASTOLIC BLOOD PRESSURE: 72 MMHG | HEIGHT: 72 IN | SYSTOLIC BLOOD PRESSURE: 118 MMHG | BODY MASS INDEX: 27.09 KG/M2

## 2023-12-12 DIAGNOSIS — E29.1 HYPOGONADISM IN MALE: Primary | ICD-10-CM

## 2023-12-12 PROCEDURE — 99214 OFFICE O/P EST MOD 30 MIN: CPT | Performed by: NURSE PRACTITIONER

## 2023-12-12 NOTE — PROGRESS NOTES
Office Visit Established Male Patient     Patient Name: Jan Vyas  : 1975   MRN: [id number]     Chief Complaint:   Chief Complaint   Patient presents with    Follow-up     Hypogonadism        History of Present Illness: Mr. Jan Vyas is a 48 y.o. male who presents today for follow up with hypogonadism.  He recently had GI issues was on antibiotics and a steroid Dosepak he was waking up in cold sweats at that time he was concerned it could be related to the Arimidex and he decreased his dosage to half a tab and has now stopped it but is thinking he would like to return to just taking half a tablet of Arimidex weekly with his testosterone.           Subjective      Review of System:   As noted in HPI    Past Medical History:   Past Medical History:   Diagnosis Date    HTN (hypertension)     Hypogonadism male        Past Surgical History:   Past Surgical History:   Procedure Laterality Date    COLONOSCOPY N/A 2023    Procedure: COLONOSCOPY with polypectomy and biopsy;  Surgeon: Irina Wright MD;  Location: [facility] ENDOSCOPY;  Service: Gastroenterology;  Laterality: N/A;    FOOT FRACTURE SURGERY      SHOULDER SURGERY         Family History:   Family History   Problem Relation Age of Onset    Diabetes Mother     Hypertension Mother     Hypertension Father     Cancer Maternal Grandmother     Diabetes Maternal Grandmother     Cancer Maternal Grandfather     Hypertension Maternal Grandfather     Diabetes Maternal Grandfather     Heart disease Paternal Grandfather        Social History:   Social History     Socioeconomic History    Marital status:    Tobacco Use    Smoking status: Never    Smokeless tobacco: Current     Types: Snuff   Vaping Use    Vaping Use: Never used   Substance and Sexual Activity    Alcohol use: Not Currently    Drug use: Never    Sexual activity: Defer       Medications:     Current Outpatient Medications:     Alcohol Swabs pads, Clean area prior to  "injection, Disp: 100 each, Rfl: 11    anastrozole (Arimidex) 1 MG tablet, Take 1 tablet by mouth 1 (One) Time Per Week., Disp: 12 tablet, Rfl: 3    carvedilol (COREG) 12.5 MG tablet, TAKE ONE TABLET BY MOUTH TWICE A DAY WITH MEALS, Disp: 180 tablet, Rfl: 3    fluticasone (FLONASE) 50 MCG/ACT nasal spray, SPRAY TWO SPRAYS IN EACH NOSTRIL ONCE DAILY, Disp: 16 mL, Rfl: 3    ibuprofen (ADVIL,MOTRIN) 800 MG tablet, Take 1 tablet by mouth Every 6 (Six) Hours As Needed., Disp: , Rfl:     levocetirizine (XYZAL) 5 MG tablet, Take 1 tablet by mouth Every Evening., Disp: 90 tablet, Rfl: 3    losartan-hydrochlorothiazide (HYZAAR) 100-25 MG per tablet, TAKE ONE TABLET BY MOUTH DAILY (Patient taking differently: Take 1 tablet by mouth Daily.), Disp: 90 tablet, Rfl: 3    montelukast (SINGULAIR) 10 MG tablet, TAKE ONE TABLET BY MOUTH DAILY (Patient taking differently: Take 1 tablet by mouth Every Night.), Disp: 90 tablet, Rfl: 3    Needle, Disp, (B-D DISP NEEDLE 25GX1\") 25G X 1\" misc, Use 1 device 2 times weekly for injection, Disp: 50 each, Rfl: 3    Needle, Disp, (BD Disp Needle) 23G X 1\" misc, Use to inject 1 time weekly, Disp: 50 each, Rfl: 11    Needle, Disp, 18G X 1-1/2\" misc, Use for drawing up the medication, Disp: 50 each, Rfl: 3    omeprazole (priLOSEC) 40 MG capsule, TAKE ONE CAPSULE BY MOUTH DAILY, Disp: 90 capsule, Rfl: 3    SUMAtriptan (IMITREX) 50 MG tablet, TAKE ONE TABLET BY MOUTH AT ONSET OF HEADACHE; MAY REPEAT ONE TABLET IN 2 HOURS IF HEADACHE NOT RELIEVED. (Patient taking differently: Take 1 tablet by mouth 1 (One) Time As Needed. TAKE ONE TABLET BY MOUTH AT ONSET OF HEADACHE; MAY REPEAT ONE TABLET IN 2 HOURS IF HEADACHE NOT RELIEVED.), Disp: 9 tablet, Rfl: 2    Syringe, Disposable, 3 ML misc, 1 syringe 1 (One) Time Per Week., Disp: 100 each, Rfl: 11    Testosterone Cypionate (DEPOTESTOTERONE CYPIONATE) 200 MG/ML injection, INJECT 1 ML INTRAMUSCULARLY ONCE WEEKLY, Disp: 4 mL, Rfl: 0    Allergies:   No Known " "Allergies    Objective     Physical Exam:   Vital Signs:   Vitals:    12/12/23 1322   BP: 118/72   BP Location: Left arm   Patient Position: Sitting   Cuff Size: Adult   Weight: 90.7 kg (200 lb)   Height: 182.9 cm (72.01\")     Body mass index is 27.12 kg/m².     Physical Exam  Constitutional: NAD, WDWN.   Neurological: A + O x 3.   Psychiatric:  Normal mood and affect    Labs  Brief Urine Lab Results       None            Lab Results   Component Value Date    GLUCOSE 98 12/15/2022    CALCIUM 9.5 12/15/2022     12/15/2022    K 4.1 12/15/2022    CO2 31.1 (H) 12/15/2022    CL 97 (L) 12/15/2022    BUN 25 (H) 12/15/2022    CREATININE 1.19 12/15/2022    EGFRIFAFRI 84 03/24/2020    EGFRIFNONA 69 03/24/2020    BCR 21.0 12/15/2022    ANIONGAP 16 04/09/2015       Lab Results   Component Value Date    WBC 6.41 03/24/2020    HGB 14.9 10/27/2023    HCT 42.8 10/27/2023    MCV 88.0 03/24/2020     03/24/2020            Radiographic Studies  No Images in the past 120 days found..    I have reviewed the above labs and imaging.     Assessment / Plan      Assessment/Plan:   Diagnoses and all orders for this visit:    1. Hypogonadism in male (Primary)  -     Estradiol  -     Hemoglobin & Hematocrit, Blood; Future  -     Testosterone; Future    48-year-old male with a history of hypogonadism taking 200 mg testosterone cypionate weekly he is doing well with this dosage labs remain stable.  After starting Arimidex estradiol lab is within normal limits, he had decreased his dosage to have a tablet of Arimidex and wishes to continue this..  H&H are within normal limits, total testosterone is 786.    Continue testosterone cypionate 200 mg weekly  Continue Arimidex 0.5 mg weekly  Obtain TT, PSA, estradiol, hematocrit and hemoglobin 3 months    Follow Up:   Return in about 3 months (around 3/12/2024) for Follow up Zackery.    SAMMI Howell,NP-C  [facility]   "

## 2024-01-10 DIAGNOSIS — I10 ESSENTIAL HYPERTENSION: ICD-10-CM

## 2024-01-10 DIAGNOSIS — E29.1 HYPOGONADISM IN MALE: ICD-10-CM

## 2024-01-10 DIAGNOSIS — E29.1 HYPOGONADISM IN MALE: Primary | ICD-10-CM

## 2024-01-10 RX ORDER — TESTOSTERONE CYPIONATE 200 MG/ML
INJECTION, SOLUTION INTRAMUSCULAR
Qty: 4 ML | Refills: 0 | Status: SHIPPED | OUTPATIENT
Start: 2024-01-10

## 2024-01-10 RX ORDER — LOSARTAN POTASSIUM AND HYDROCHLOROTHIAZIDE 25; 100 MG/1; MG/1
TABLET ORAL
Qty: 90 TABLET | Refills: 3 | Status: SHIPPED | OUTPATIENT
Start: 2024-01-10

## 2024-01-10 RX ORDER — SYRINGE WITH NEEDLE, 1 ML 25GX5/8"
SYRINGE, EMPTY DISPOSABLE MISCELLANEOUS
Qty: 24 EACH | Refills: 5 | Status: SHIPPED | OUTPATIENT
Start: 2024-01-11

## 2024-01-30 RX ORDER — MONTELUKAST SODIUM 10 MG/1
TABLET ORAL
Qty: 90 TABLET | Refills: 3 | Status: SHIPPED | OUTPATIENT
Start: 2024-01-30

## 2024-02-19 DIAGNOSIS — E29.1 HYPOGONADISM IN MALE: ICD-10-CM

## 2024-02-19 RX ORDER — TESTOSTERONE CYPIONATE 200 MG/ML
200 INJECTION, SOLUTION INTRAMUSCULAR
Qty: 4 ML | Refills: 0 | Status: SHIPPED | OUTPATIENT
Start: 2024-02-19

## 2024-03-08 ENCOUNTER — OFFICE VISIT (OUTPATIENT)
Dept: GASTROENTEROLOGY | Facility: CLINIC | Age: 49
End: 2024-03-08
Payer: COMMERCIAL

## 2024-03-08 VITALS
HEART RATE: 68 BPM | WEIGHT: 210 LBS | DIASTOLIC BLOOD PRESSURE: 78 MMHG | SYSTOLIC BLOOD PRESSURE: 122 MMHG | OXYGEN SATURATION: 99 % | BODY MASS INDEX: 28.47 KG/M2

## 2024-03-08 DIAGNOSIS — Z80.0 FAMILY HISTORY OF COLON CANCER: Primary | ICD-10-CM

## 2024-03-08 DIAGNOSIS — K57.30 DIVERTICULOSIS OF COLON: ICD-10-CM

## 2024-03-08 NOTE — PROGRESS NOTES
Follow Up Note     Date: 2024   Patient Name: Jan Vyas  MRN: [id number]  : 1975     Referring Physician: Lázaro Hadley MD    Chief Complaint:    Chief Complaint   Patient presents with    Follow-up    Colon Polyps       Interval History:   3/8/2024  Jan Vyas is a 48 y.o. male who is here today for follow up.    Reports that his bowels are moving well.  We discussed findings from his recent colonoscopy.  No concerning findings identified.  He did have diverticulosis in the left colon.    We discussed his dietary fiber intake.  He tracks his dietary macros and it seems that he is getting an adequate amount of fiber in his diet.  Previously when we had talked in the office he had cut back on some of his vegetable-based fibers and changed his diet.  As things currently stand he is getting close to 30 g of fiber a day between fruits and other dietary sources.      Subjective      Past Medical History:   Diagnosis Date    HTN (hypertension)     Hypogonadism male      Past Surgical History:   Procedure Laterality Date    COLONOSCOPY N/A 2023    Procedure: COLONOSCOPY with polypectomy and biopsy;  Surgeon: Irina Wright MD;  Location: [facility] ENDOSCOPY;  Service: Gastroenterology;  Laterality: N/A;    FOOT FRACTURE SURGERY      SHOULDER SURGERY  2014     Family History   Problem Relation Age of Onset    Diabetes Mother     Hypertension Mother     Hypertension Father     Cancer Maternal Grandmother     Diabetes Maternal Grandmother     Cancer Maternal Grandfather     Hypertension Maternal Grandfather     Diabetes Maternal Grandfather     Heart disease Paternal Grandfather      Social History     Socioeconomic History    Marital status:    Tobacco Use    Smoking status: Never    Smokeless tobacco: Current     Types: Snuff   Vaping Use    Vaping status: Never Used   Substance and Sexual Activity    Alcohol use: Not Currently    Drug use: Never    Sexual activity: Defer  "      Current Outpatient Medications:     Alcohol Swabs pads, Clean area prior to injection, Disp: 100 each, Rfl: 11    anastrozole (Arimidex) 1 MG tablet, Take 1 tablet by mouth 1 (One) Time Per Week., Disp: 12 tablet, Rfl: 3    carvedilol (COREG) 12.5 MG tablet, TAKE ONE TABLET BY MOUTH TWICE A DAY WITH MEALS, Disp: 180 tablet, Rfl: 3    fluticasone (FLONASE) 50 MCG/ACT nasal spray, SPRAY TWO SPRAYS IN EACH NOSTRIL ONCE DAILY, Disp: 16 mL, Rfl: 3    ibuprofen (ADVIL,MOTRIN) 800 MG tablet, Take 1 tablet by mouth Every 6 (Six) Hours As Needed., Disp: , Rfl:     levocetirizine (XYZAL) 5 MG tablet, Take 1 tablet by mouth Every Evening., Disp: 90 tablet, Rfl: 3    losartan-hydrochlorothiazide (HYZAAR) 100-25 MG per tablet, TAKE ONE TABLET BY MOUTH DAILY, Disp: 90 tablet, Rfl: 3    montelukast (SINGULAIR) 10 MG tablet, TAKE ONE TABLET BY MOUTH DAILY, Disp: 90 tablet, Rfl: 3    Needle, Disp, (B-D DISP NEEDLE 25GX1\") 25G X 1\" misc, Use 1 device 2 times weekly for injection, Disp: 50 each, Rfl: 3    Needle, Disp, (BD Disp Needle) 23G X 1\" misc, Use to inject 1 time weekly, Disp: 50 each, Rfl: 11    Needle, Disp, 18G X 1-1/2\" misc, Use for drawing up the medication, Disp: 50 each, Rfl: 3    omeprazole (priLOSEC) 40 MG capsule, TAKE ONE CAPSULE BY MOUTH DAILY, Disp: 90 capsule, Rfl: 3    SUMAtriptan (IMITREX) 50 MG tablet, TAKE ONE TABLET BY MOUTH AT ONSET OF HEADACHE; MAY REPEAT ONE TABLET IN 2 HOURS IF HEADACHE NOT RELIEVED. (Patient taking differently: Take 1 tablet by mouth 1 (One) Time As Needed. TAKE ONE TABLET BY MOUTH AT ONSET OF HEADACHE; MAY REPEAT ONE TABLET IN 2 HOURS IF HEADACHE NOT RELIEVED.), Disp: 9 tablet, Rfl: 2    Syringe, Disposable, 3 ML misc, 1 syringe 1 (One) Time Per Week., Disp: 100 each, Rfl: 11    Syringe/Needle, Disp, (BD Plastipak Syringe) 21G X 1\" 3 ML misc, Use 1 syringe 2 times per week, Disp: 24 each, Rfl: 5    Testosterone Cypionate (DEPOTESTOTERONE CYPIONATE) 200 MG/ML injection, INJECT 1 " ML INTRAMUSCULARLY ONCE WEEKLY, Disp: 4 mL, Rfl: 0  No Known Allergies  Review of Systems   Constitutional:  Negative for unexpected weight loss.   HENT:  Negative for trouble swallowing.    Gastrointestinal:  Negative for abdominal distention, abdominal pain, anal bleeding, blood in stool, constipation, diarrhea, nausea, rectal pain, vomiting, GERD and indigestion.       The following portions of the patient's history were reviewed and updated as appropriate: allergies, current medications, past family history, past medical history, past social history, past surgical history and problem list.    Objective     Physical Exam:  Vitals:    03/08/24 0854   BP: 122/78   Pulse: 68   SpO2: 99%   Weight: 95.3 kg (210 lb)       Physical Exam  Constitutional:       General: He is not in acute distress.     Appearance: Normal appearance.   HENT:      Head: Normocephalic and atraumatic.   Eyes:      General: No scleral icterus.  Cardiovascular:      Rate and Rhythm: Normal rate.   Pulmonary:      Effort: Pulmonary effort is normal. No respiratory distress.   Skin:     Coloration: Skin is not jaundiced or pale.   Neurological:      General: No focal deficit present.      Mental Status: He is alert and oriented to person, place, and time.   Psychiatric:         Mood and Affect: Mood normal.         Behavior: Behavior normal.         Results Review:   I reviewed the patient's new clinical results.    No visits with results within 90 Day(s) from this visit.   Latest known visit with results is:   Admission on 11/17/2023, Discharged on 11/17/2023   Component Date Value Ref Range Status    Reference Lab Report 11/17/2023    Final                    Value:[facility]  [address] [place] [address]  Phone: [phone] or [phone]  Fax: [phone]  Sal Berumen M.D., Medical Director    PATIENT NAME                                     LABORATORY [address].  [id number]   SYBIL PRESTONMarek                "             [id number]  [id number]                                 AGE                    SEX   SSN              CLIENT REF #  [facility]                    48        1975           [id number]      [id number]    [address] [place] [address] BY-PASS                        REQUESTING M.D.           ATTENDING MADALID.         COPY TO.  [address]                                [place] [address]                         DATE COLLECTED            DATE RECEIVED          DATE REPORTED  2023    DIAGNOSIS:  A.     ASCENDING COLON POLYP:  Polypoid fragment of benign colonic mucosa with prominent lymphoid                           aggregate  Negative for epithelial dysplasia (multiple deeper levels examined)  B.     SIGMOID COLON BIOPSY:  Colonic mucosa negative for significant diagnostic abnormality  Negative for microscopic colitis pattern    [facility]    CLINICAL HISTORY:  Colon cancer screening    SPECIMENS RECEIVED:  A.    ASCENDING COLON POLYP  B.    SIGMOID COLON BIOPSY    MICROSCOPIC DESCRIPTION:  Tissue blocks are prepared and slides are examined microscopically on all  specimens. See diagnosis for details.    Professional interpretation rendered by Ayah Powers D.O. at [facility], [address] [place] [address].    GROSS DESCRIPTION:  A.    Labeled \"ascending colon\" consists of 2 pieces of tan soft tissue measuring  0.5 x 0.3 x 0.2 cm in aggregate.  Submitted entirely 1 block.  BKO  B.    Labeled \"sigmoid colon abnormal mucosa\" consists of 3 pieces of tan soft  tissue measuring 0.7 x 0.3 x 0.2 cm in aggregate.  Submitted entirely 1  block.  BKO    REVIEWED, DIAGNOSED AND                           ELECTRONICALLY  SIGNED BY:    Ayah Powers D.O.  CPT CODES:  88305x2        No radiology results for the last 90 days.    Assessment / Plan      Diagnoses and all orders for this visit:    1. Family history " of colon cancer (Primary)    2. Diverticulosis of colon       Does not feel constipated anymore.  Bowels are moving regularly.  Results of prior testing were communicated with him and his questions were addressed.  At the time being, we will continue monitoring dietary macros.  He will aim to get at least 30 g of fiber a day.  There are 2 second-degree relatives (Grandparents) that have had colon cancer, so I would repeat a screening colonoscopy in 5 years.    Follow Up:   Return in about 1 year (around 3/8/2025).    Irina Wright MD  [facility]  3/8/2024  12:29 EST     Part of this note may be an electronic transcription/translation of spoken language to printed text using the Dragon Dictation System.

## 2024-03-21 ENCOUNTER — LAB (OUTPATIENT)
Dept: LAB | Facility: HOSPITAL | Age: 49
End: 2024-03-21
Payer: COMMERCIAL

## 2024-03-21 DIAGNOSIS — E29.1 HYPOGONADISM IN MALE: ICD-10-CM

## 2024-03-21 LAB
ESTRADIOL SERPL HS-MCNC: 31.6 PG/ML
HCT VFR BLD AUTO: 46.6 % (ref 37.5–51)
HGB BLD-MCNC: 15.9 G/DL (ref 13–17.7)
PSA SERPL-MCNC: 0.22 NG/ML (ref 0–4)
TESTOST SERPL-MCNC: 713 NG/DL (ref 249–836)

## 2024-03-21 PROCEDURE — 85018 HEMOGLOBIN: CPT

## 2024-03-21 PROCEDURE — 85014 HEMATOCRIT: CPT

## 2024-03-21 PROCEDURE — 84153 ASSAY OF PSA TOTAL: CPT

## 2024-03-21 PROCEDURE — 82670 ASSAY OF TOTAL ESTRADIOL: CPT | Performed by: NURSE PRACTITIONER

## 2024-03-21 PROCEDURE — 84403 ASSAY OF TOTAL TESTOSTERONE: CPT

## 2024-03-26 DIAGNOSIS — E29.1 HYPOGONADISM IN MALE: ICD-10-CM

## 2024-03-27 RX ORDER — TESTOSTERONE CYPIONATE 200 MG/ML
200 INJECTION, SOLUTION INTRAMUSCULAR
Qty: 4 ML | Refills: 0 | Status: SHIPPED | OUTPATIENT
Start: 2024-03-27

## 2024-03-28 DIAGNOSIS — E29.1 HYPOGONADISM IN MALE: ICD-10-CM

## 2024-03-28 RX ORDER — NEEDLES, DISPOSABLE 25GX5/8"
NEEDLE, DISPOSABLE MISCELLANEOUS
Qty: 12 EACH | Refills: 11 | Status: SHIPPED | OUTPATIENT
Start: 2024-03-28

## 2024-04-01 ENCOUNTER — OFFICE VISIT (OUTPATIENT)
Dept: UROLOGY | Facility: CLINIC | Age: 49
End: 2024-04-01
Payer: COMMERCIAL

## 2024-04-01 VITALS — BODY MASS INDEX: 28.44 KG/M2 | WEIGHT: 210 LBS | HEIGHT: 72 IN

## 2024-04-01 DIAGNOSIS — E29.1 HYPOGONADISM IN MALE: Primary | ICD-10-CM

## 2024-04-01 PROCEDURE — 99213 OFFICE O/P EST LOW 20 MIN: CPT | Performed by: NURSE PRACTITIONER

## 2024-04-01 RX ORDER — AMOXICILLIN AND CLAVULANATE POTASSIUM 875; 125 MG/1; MG/1
TABLET, FILM COATED ORAL
COMMUNITY
Start: 2024-03-23

## 2024-04-01 NOTE — PROGRESS NOTES
Office Visit Established Male Patient     Patient Name: Jan Vyas  : 1975   MRN: [id number]     Chief Complaint:   Chief Complaint   Patient presents with    Follow-up     Testosterone         History of Present Illness: Mr. Jan Vyas is a 48 y.o. male who presents today for follow up with hypogonadism.  Doing well feeling food on TRT, he does micro dosing and takes 1/2 anastrozole to manage estradiol levels      Subjective      Review of System:   As noted in HPI    Past Medical History:   Past Medical History:   Diagnosis Date    HTN (hypertension)     Hypogonadism male        Past Surgical History:   Past Surgical History:   Procedure Laterality Date    COLONOSCOPY N/A 2023    Procedure: COLONOSCOPY with polypectomy and biopsy;  Surgeon: Irina Wright MD;  Location: [facility] ENDOSCOPY;  Service: Gastroenterology;  Laterality: N/A;    FOOT FRACTURE SURGERY      SHOULDER SURGERY         Family History:   Family History   Problem Relation Age of Onset    Diabetes Mother     Hypertension Mother     Hypertension Father     Cancer Maternal Grandmother     Diabetes Maternal Grandmother     Cancer Maternal Grandfather     Hypertension Maternal Grandfather     Diabetes Maternal Grandfather     Heart disease Paternal Grandfather        Social History:   Social History     Socioeconomic History    Marital status:    Tobacco Use    Smoking status: Never     Passive exposure: Never    Smokeless tobacco: Current     Types: Snuff   Vaping Use    Vaping status: Never Used   Substance and Sexual Activity    Alcohol use: Not Currently    Drug use: Never    Sexual activity: Defer       Medications:     Current Outpatient Medications:     amoxicillin-clavulanate (AUGMENTIN) 875-125 MG per tablet, , Disp: , Rfl:     Alcohol Swabs pads, Clean area prior to injection, Disp: 100 each, Rfl: 11    anastrozole (Arimidex) 1 MG tablet, Take 1 tablet by mouth 1 (One) Time Per Week., Disp: 12  "tablet, Rfl: 3    carvedilol (COREG) 12.5 MG tablet, TAKE ONE TABLET BY MOUTH TWICE A DAY WITH MEALS, Disp: 180 tablet, Rfl: 3    fluticasone (FLONASE) 50 MCG/ACT nasal spray, SPRAY TWO SPRAYS IN EACH NOSTRIL ONCE DAILY, Disp: 16 mL, Rfl: 3    ibuprofen (ADVIL,MOTRIN) 800 MG tablet, Take 1 tablet by mouth Every 6 (Six) Hours As Needed., Disp: , Rfl:     levocetirizine (XYZAL) 5 MG tablet, Take 1 tablet by mouth Every Evening., Disp: 90 tablet, Rfl: 3    losartan-hydrochlorothiazide (HYZAAR) 100-25 MG per tablet, TAKE ONE TABLET BY MOUTH DAILY, Disp: 90 tablet, Rfl: 3    montelukast (SINGULAIR) 10 MG tablet, TAKE ONE TABLET BY MOUTH DAILY, Disp: 90 tablet, Rfl: 3    Needle, Disp, (B-D DISP NEEDLE 25GX1\") 25G X 1\" misc, Use 1 device 2 times weekly for injection, Disp: 50 each, Rfl: 3    Needle, Disp, (BD Disp Needle) 23G X 1\" misc, Use to inject 1 time weekly, Disp: 50 each, Rfl: 11    Needle, Disp, (BD Disp Needles) 18G X 1-1/2\" misc, USE FOR DRAWING UP THE MEDICATION, Disp: 12 each, Rfl: 11    omeprazole (priLOSEC) 40 MG capsule, TAKE ONE CAPSULE BY MOUTH DAILY, Disp: 90 capsule, Rfl: 3    SUMAtriptan (IMITREX) 50 MG tablet, TAKE ONE TABLET BY MOUTH AT ONSET OF HEADACHE; MAY REPEAT ONE TABLET IN 2 HOURS IF HEADACHE NOT RELIEVED. (Patient taking differently: Take 1 tablet by mouth 1 (One) Time As Needed. TAKE ONE TABLET BY MOUTH AT ONSET OF HEADACHE; MAY REPEAT ONE TABLET IN 2 HOURS IF HEADACHE NOT RELIEVED.), Disp: 9 tablet, Rfl: 2    Syringe, Disposable, 3 ML misc, 1 syringe 1 (One) Time Per Week., Disp: 100 each, Rfl: 11    Syringe/Needle, Disp, (BD Plastipak Syringe) 21G X 1\" 3 ML misc, Use 1 syringe 2 times per week, Disp: 24 each, Rfl: 5    Testosterone Cypionate (DEPOTESTOTERONE CYPIONATE) 200 MG/ML injection, Inject 1 mL into the appropriate muscle as directed by prescriber Every 7 (Seven) Days., Disp: 4 mL, Rfl: 0    Allergies:   No Known Allergies    Objective     Physical Exam:   Vital Signs:   Vitals:    " "04/01/24 1417   Weight: 95.3 kg (210 lb)   Height: 182.9 cm (72.01\")     Body mass index is 28.47 kg/m².     Physical Exam  Constitutional: NAD, WDWN.   Neurological: A + O x 3.   Psychiatric:  Normal mood and affect    Labs  Brief Urine Lab Results       None            Lab Results   Component Value Date    GLUCOSE 98 12/15/2022    CALCIUM 9.5 12/15/2022     12/15/2022    K 4.1 12/15/2022    CO2 31.1 (H) 12/15/2022    CL 97 (L) 12/15/2022    BUN 25 (H) 12/15/2022    CREATININE 1.19 12/15/2022    EGFRIFAFRI 84 03/24/2020    EGFRIFNONA 69 03/24/2020    BCR 21.0 12/15/2022    ANIONGAP 16 04/09/2015       Lab Results   Component Value Date    WBC 6.41 03/24/2020    HGB 15.9 03/21/2024    HCT 46.6 03/21/2024    MCV 88.0 03/24/2020     03/24/2020            Radiographic Studies  No Images in the past 120 days found..    I have reviewed the above labs and imaging.     Assessment / Plan      Assessment/Plan:   Diagnoses and all orders for this visit:    1. Hypogonadism in male (Primary)  -     Hemoglobin & Hematocrit, Blood; Future  -     Testosterone; Future  -     Estradiol; Future     49 yo here for regular follow up with hypogonadism, levels remain stable on TRT, TT is 713 and hematocrit is 46.6, estradiol is 31 and PSA was with in normal limits. Will continue 200mg testosterone cypionate weekly due to stability will have patient follow-up in 6 months    Continue testosterone cypionate 200 mg weekly  Continue anastrozole 0.5 to 1 mg weekly  Obtain TT, hematocrit and hemoglobin 6 months    Follow Up:   Return in about 6 months (around 10/1/2024) for Follow up SAMMI Spencer,NP-C  [facility]   "

## 2024-04-17 ENCOUNTER — TELEPHONE (OUTPATIENT)
Dept: INTERNAL MEDICINE | Facility: CLINIC | Age: 49
End: 2024-04-17
Payer: COMMERCIAL

## 2024-04-17 NOTE — TELEPHONE ENCOUNTER
"Caller: Jan Vyas \"Kim\"    Relationship to patient: Self    Best call back number: [phone]     Chief complaint: HAS A SPOT ON HIS LEG (SMALL BLACK DOT 1MM IN SIZE)     Type of visit: OFFICE VISIT       Requested date: ASAP      If rescheduling, when is the original appointment: 4/29/24 AT 3:15      Additional notes:PLEASE CALL TO GET WORKED IN SOONER  "

## 2024-04-18 ENCOUNTER — OFFICE VISIT (OUTPATIENT)
Dept: INTERNAL MEDICINE | Facility: CLINIC | Age: 49
End: 2024-04-18
Payer: COMMERCIAL

## 2024-04-18 VITALS
OXYGEN SATURATION: 100 % | BODY MASS INDEX: 27.63 KG/M2 | WEIGHT: 204 LBS | HEIGHT: 72 IN | TEMPERATURE: 97.3 F | DIASTOLIC BLOOD PRESSURE: 80 MMHG | SYSTOLIC BLOOD PRESSURE: 126 MMHG | HEART RATE: 74 BPM

## 2024-04-18 DIAGNOSIS — S70.361A TICK BITE OF RIGHT THIGH, INITIAL ENCOUNTER: Primary | ICD-10-CM
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
## 2024-04-18 DIAGNOSIS — K21.9 GASTROESOPHAGEAL REFLUX DISEASE, UNSPECIFIED WHETHER ESOPHAGITIS PRESENT: ICD-10-CM

## 2024-04-18 DIAGNOSIS — Z00.00 ROUTINE GENERAL MEDICAL EXAMINATION AT A HEALTH CARE FACILITY: ICD-10-CM

## 2024-04-18 DIAGNOSIS — I10 ESSENTIAL HYPERTENSION: ICD-10-CM

## 2024-04-18 DIAGNOSIS — R35.1 NOCTURIA: ICD-10-CM

## 2024-04-18 RX ORDER — OMEPRAZOLE 20 MG/1
20 CAPSULE, DELAYED RELEASE ORAL DAILY
Qty: 90 CAPSULE | Refills: 3 | Status: SHIPPED | OUTPATIENT
Start: 2024-04-18

## 2024-04-18 RX ORDER — DOXYCYCLINE HYCLATE 100 MG/1
200 CAPSULE ORAL ONCE
Qty: 2 CAPSULE | Refills: 0 | Status: SHIPPED | OUTPATIENT
Start: 2024-04-18 | End: 2024-04-18

## 2024-04-18 NOTE — PROGRESS NOTES
"Subjective  Jan Vyas is a 48 y.o. male    HPI coming in for evaluation has noticed 2 ticks on his inner thigh and one on his penis which may have been on him for about 48 hours the ticks were not engorged.  He denies any rash fever or chills he has a history of hypertension and reflux esophagitis.  No other new complaints he is on hormone replacement therapy for hypogonadism    The following portions of the patient's history were reviewed and updated as appropriate: allergies, current medications, past family history, past medical history, past social history, past surgical history, and problem list.     Review of Systems   Constitutional: Negative.  Negative for activity change, appetite change, fatigue and fever.   HENT:  Negative for congestion, ear discharge, ear pain and trouble swallowing.    Eyes:  Negative for photophobia and visual disturbance.   Respiratory:  Negative for cough and shortness of breath.    Cardiovascular:  Negative for chest pain and palpitations.   Gastrointestinal:  Negative for abdominal distention, abdominal pain, constipation, diarrhea, nausea and vomiting.   Endocrine: Negative.    Genitourinary:  Negative for dysuria, hematuria and urgency.   Musculoskeletal:  Positive for arthralgias. Negative for back pain, joint swelling and myalgias.   Skin:  Positive for rash. Negative for color change.   Allergic/Immunologic: Negative.    Neurological:  Negative for dizziness, weakness, light-headedness and headaches.   Hematological:  Negative for adenopathy. Does not bruise/bleed easily.   Psychiatric/Behavioral:  Negative for agitation, confusion and dysphoric mood. The patient is not nervous/anxious.        Visit Vitals  /80   Pulse 74   Temp 97.3 °F (36.3 °C) (Infrared)   Ht 182.9 cm (72\")   Wt 92.5 kg (204 lb)   SpO2 100%   BMI 27.67 kg/m²       Objective  Physical Exam  Constitutional:       General: He is not in acute distress.     Appearance: He is well-developed. "   HENT:      Nose: Nose normal.   Eyes:      General: No scleral icterus.     Conjunctiva/sclera: Conjunctivae normal.   Neck:      Thyroid: No thyromegaly.      Trachea: No tracheal deviation.   Cardiovascular:      Rate and Rhythm: Normal rate and regular rhythm.      Heart sounds: No murmur heard.     No friction rub.   Pulmonary:      Effort: No respiratory distress.      Breath sounds: No wheezing or rales.   Abdominal:      General: There is no distension.      Palpations: Abdomen is soft. There is no mass.      Tenderness: There is no abdominal tenderness. There is no guarding.   Musculoskeletal:         General: No deformity. Normal range of motion.   Lymphadenopathy:      Cervical: No cervical adenopathy.   Skin:     General: Skin is warm and dry.      Findings: No erythema or rash.          Neurological:      Mental Status: He is alert and oriented to person, place, and time.      Cranial Nerves: No cranial nerve deficit.      Coordination: Coordination normal.      Deep Tendon Reflexes: Reflexes are normal and symmetric.   Psychiatric:         Behavior: Behavior normal.         Thought Content: Thought content normal.         Judgment: Judgment normal.       Diagnoses and all orders for this visit:    Tick bite of right thigh, initial encounter tick attached for more than 48 hours empiric prophylactic treatment with doxycycline 200 mg    Gastroesophageal reflux disease, unspecified whether esophagitis present continue with reflux precautions and proton pump inhibitors    Essential hypertension stable with current meds and low-salt diet    Other orders  -     omeprazole (priLOSEC) 20 MG capsule; Take 1 capsule by mouth Daily.  -     doxycycline (VIBRAMYCIN) 100 MG capsule; Take 2 capsules by mouth 1 (One) Time for 1 dose.

## 2024-05-07 DIAGNOSIS — E29.1 HYPOGONADISM IN MALE: ICD-10-CM

## 2024-05-07 RX ORDER — TESTOSTERONE CYPIONATE 200 MG/ML
200 INJECTION, SOLUTION INTRAMUSCULAR
Qty: 4 ML | Refills: 0 | Status: SHIPPED | OUTPATIENT
Start: 2024-05-07

## 2024-06-07 DIAGNOSIS — E29.1 HYPOGONADISM IN MALE: ICD-10-CM

## 2024-06-07 RX ORDER — ANASTROZOLE 1 MG/1
1 TABLET ORAL WEEKLY
Qty: 12 TABLET | Refills: 3 | Status: SHIPPED | OUTPATIENT
Start: 2024-06-07

## 2024-06-07 RX ORDER — TESTOSTERONE CYPIONATE 200 MG/ML
INJECTION, SOLUTION INTRAMUSCULAR
Qty: 4 ML | Refills: 0 | Status: SHIPPED | OUTPATIENT
Start: 2024-06-07

## 2024-06-10 ENCOUNTER — TELEPHONE (OUTPATIENT)
Dept: INTERNAL MEDICINE | Facility: CLINIC | Age: 49
End: 2024-06-10
Payer: COMMERCIAL

## 2024-06-10 NOTE — TELEPHONE ENCOUNTER
Patient states he needs a physical sheet filled out for his job.  His last physical was in Nov and he is not due for another one until then.  He is asking if Dr. Hadley could fill out the form for him using the information from his last physical.  Please advise.  Phone number verified.

## 2024-07-08 DIAGNOSIS — E29.1 HYPOGONADISM IN MALE: ICD-10-CM

## 2024-07-09 RX ORDER — TESTOSTERONE CYPIONATE 200 MG/ML
INJECTION, SOLUTION INTRAMUSCULAR
Qty: 4 ML | Refills: 0 | Status: SHIPPED | OUTPATIENT
Start: 2024-07-09

## 2024-08-11 DIAGNOSIS — E29.1 HYPOGONADISM IN MALE: ICD-10-CM

## 2024-08-12 RX ORDER — SYRINGE, DISPOSABLE, 1 ML
SYRINGE, EMPTY DISPOSABLE MISCELLANEOUS
Qty: 10 EACH | Refills: 11 | Status: SHIPPED | OUTPATIENT
Start: 2024-08-12

## 2024-08-12 RX ORDER — NEEDLES, DISPOSABLE 25GX5/8"
NEEDLE, DISPOSABLE MISCELLANEOUS
Qty: 50 EACH | Refills: 11 | Status: SHIPPED | OUTPATIENT
Start: 2024-08-12

## 2024-08-12 RX ORDER — LEVOCETIRIZINE DIHYDROCHLORIDE 5 MG/1
5 TABLET, FILM COATED ORAL EVERY EVENING
Qty: 90 TABLET | Refills: 3 | Status: SHIPPED | OUTPATIENT
Start: 2024-08-12

## 2024-08-12 RX ORDER — TESTOSTERONE CYPIONATE 200 MG/ML
INJECTION, SOLUTION INTRAMUSCULAR
Qty: 4 ML | Refills: 0 | Status: SHIPPED | OUTPATIENT
Start: 2024-08-12

## 2024-09-21 DIAGNOSIS — E29.1 HYPOGONADISM IN MALE: ICD-10-CM

## 2024-09-23 RX ORDER — FLUTICASONE PROPIONATE 50 UG/1
2 SPRAY, METERED NASAL DAILY
Qty: 16 ML | Refills: 3 | Status: SHIPPED | OUTPATIENT
Start: 2024-09-23

## 2024-09-23 RX ORDER — TESTOSTERONE CYPIONATE 200 MG/ML
INJECTION, SOLUTION INTRAMUSCULAR
Qty: 4 ML | Refills: 0 | Status: SHIPPED | OUTPATIENT
Start: 2024-09-23

## 2024-10-02 ENCOUNTER — LAB (OUTPATIENT)
Dept: LAB | Facility: HOSPITAL | Age: 49
End: 2024-10-02
Payer: COMMERCIAL

## 2024-10-02 LAB
ALBUMIN SERPL-MCNC: 4.6 G/DL (ref 3.5–5.2)
ALBUMIN/GLOB SERPL: 1.8 G/DL
ALP SERPL-CCNC: 59 U/L (ref 39–117)
ALT SERPL W P-5'-P-CCNC: 24 U/L (ref 1–41)
ANION GAP SERPL CALCULATED.3IONS-SCNC: 11 MMOL/L (ref 5–15)
AST SERPL-CCNC: 22 U/L (ref 1–40)
BILIRUB SERPL-MCNC: 0.4 MG/DL (ref 0–1.2)
BUN SERPL-MCNC: 21 MG/DL (ref 6–20)
BUN/CREAT SERPL: 16.4 (ref 7–25)
CALCIUM SPEC-SCNC: 9.8 MG/DL (ref 8.6–10.5)
CHLORIDE SERPL-SCNC: 101 MMOL/L (ref 98–107)
CHOLEST SERPL-MCNC: 169 MG/DL (ref 0–200)
CO2 SERPL-SCNC: 27 MMOL/L (ref 22–29)
CREAT SERPL-MCNC: 1.28 MG/DL (ref 0.76–1.27)
EGFRCR SERPLBLD CKD-EPI 2021: 69 ML/MIN/1.73
GLOBULIN UR ELPH-MCNC: 2.6 GM/DL
GLUCOSE SERPL-MCNC: 103 MG/DL (ref 65–99)
HCV AB SER QL: NORMAL
HDLC SERPL-MCNC: 36 MG/DL (ref 40–60)
LDLC SERPL CALC-MCNC: 105 MG/DL (ref 0–100)
LDLC/HDLC SERPL: 2.8 {RATIO}
POTASSIUM SERPL-SCNC: 4.3 MMOL/L (ref 3.5–5.2)
PROT SERPL-MCNC: 7.2 G/DL (ref 6–8.5)
PSA SERPL-MCNC: 0.32 NG/ML (ref 0–4)
SODIUM SERPL-SCNC: 139 MMOL/L (ref 136–145)
TRIGL SERPL-MCNC: 161 MG/DL (ref 0–150)
VLDLC SERPL-MCNC: 28 MG/DL (ref 5–40)

## 2024-10-02 PROCEDURE — 80053 COMPREHEN METABOLIC PANEL: CPT | Performed by: INTERNAL MEDICINE

## 2024-10-02 PROCEDURE — 86803 HEPATITIS C AB TEST: CPT | Performed by: INTERNAL MEDICINE

## 2024-10-02 PROCEDURE — 84403 ASSAY OF TOTAL TESTOSTERONE: CPT | Performed by: INTERNAL MEDICINE

## 2024-10-02 PROCEDURE — 85018 HEMOGLOBIN: CPT | Performed by: NURSE PRACTITIONER

## 2024-10-02 PROCEDURE — 82670 ASSAY OF TOTAL ESTRADIOL: CPT | Performed by: INTERNAL MEDICINE

## 2024-10-02 PROCEDURE — 84153 ASSAY OF PSA TOTAL: CPT | Performed by: INTERNAL MEDICINE

## 2024-10-02 PROCEDURE — 80061 LIPID PANEL: CPT | Performed by: INTERNAL MEDICINE

## 2024-10-02 PROCEDURE — 85014 HEMATOCRIT: CPT | Performed by: NURSE PRACTITIONER

## 2024-10-04 ENCOUNTER — OFFICE VISIT (OUTPATIENT)
Dept: UROLOGY | Facility: CLINIC | Age: 49
End: 2024-10-04
Payer: COMMERCIAL

## 2024-10-04 VITALS
WEIGHT: 207 LBS | DIASTOLIC BLOOD PRESSURE: 82 MMHG | HEART RATE: 89 BPM | SYSTOLIC BLOOD PRESSURE: 124 MMHG | RESPIRATION RATE: 20 BRPM | HEIGHT: 72 IN | BODY MASS INDEX: 28.04 KG/M2

## 2024-10-04 DIAGNOSIS — E29.1 HYPOGONADISM IN MALE: Primary | ICD-10-CM

## 2024-10-04 DIAGNOSIS — E28.0 ESTRADIOL EXCESS: ICD-10-CM

## 2024-10-04 RX ORDER — SYRINGE ACCESSORY
2 EACH MISCELLANEOUS 2 TIMES WEEKLY
Qty: 8 EACH | Refills: 12 | Status: SHIPPED | OUTPATIENT
Start: 2024-10-07

## 2024-10-04 NOTE — PROGRESS NOTES
"       Office Visit Follow Up      Patient Name: Jan Vyas  : 1975   MRN: [id number]     Chief Complaint:   Chief Complaint   Patient presents with    Hypogonadism       Referring Provider: No ref. provider found    History of Present Illness: Jan Vyas is a 49 y.o. male who presents today for follow up with hypogonadism  Doing well with Micro dosing      Subjective      Review of System:   As noted in HPI    Medications:     Current Outpatient Medications:     Alcohol Swabs pads, Clean area prior to injection, Disp: 100 each, Rfl: 11    anastrozole (ARIMIDEX) 1 MG tablet, TAKE 1 TABLET BY MOUTH ONCE WEEKLY, Disp: 12 tablet, Rfl: 3    carvedilol (COREG) 12.5 MG tablet, TAKE ONE TABLET BY MOUTH TWICE A DAY WITH MEALS, Disp: 180 tablet, Rfl: 3    fluticasone (FLONASE) 50 MCG/ACT nasal spray, 2 sprays by Each Nare route Daily., Disp: 16 mL, Rfl: 3    ibuprofen (ADVIL,MOTRIN) 800 MG tablet, Take 1 tablet by mouth Every 6 (Six) Hours As Needed., Disp: , Rfl:     levocetirizine (XYZAL) 5 MG tablet, TAKE ONE TABLET BY MOUTH EVERY EVENING, Disp: 90 tablet, Rfl: 3    losartan-hydrochlorothiazide (HYZAAR) 100-25 MG per tablet, TAKE ONE TABLET BY MOUTH DAILY, Disp: 90 tablet, Rfl: 3    montelukast (SINGULAIR) 10 MG tablet, TAKE ONE TABLET BY MOUTH DAILY, Disp: 90 tablet, Rfl: 3    Needle, Disp, (BD Disp Needle) 23G X 1\" misc, Use to inject 1 time weekly, Disp: 50 each, Rfl: 11    Needle, Disp, (BD Disp Needle) 23G X 1\" misc, USE 1 NEEDLE TWICE WEEKLY FOR INJECTION, Disp: 50 each, Rfl: 11    Needle, Disp, (BD Disp Needles) 18G X 1-1/2\" misc, USE FOR DRAWING UP THE MEDICATION, Disp: 12 each, Rfl: 11    [START ON 10/7/2024] Needle, Disp, (BD Eclipse Needle) 25G X 1\" misc, Use 2 Needles 2 (Two) Times a Week., Disp: 8 each, Rfl: 12    omeprazole (priLOSEC) 20 MG capsule, Take 1 capsule by mouth Daily., Disp: 90 capsule, Rfl: 3    SUMAtriptan (IMITREX) 50 MG tablet, TAKE ONE TABLET BY MOUTH AT ONSET OF " "HEADACHE; MAY REPEAT ONE TABLET IN 2 HOURS IF HEADACHE NOT RELIEVED. (Patient taking differently: Take 1 tablet by mouth 1 (One) Time As Needed. TAKE ONE TABLET BY MOUTH AT ONSET OF HEADACHE; MAY REPEAT ONE TABLET IN 2 HOURS IF HEADACHE NOT RELIEVED.), Disp: 9 tablet, Rfl: 2    Syringe, Disposable, 3 ML misc, 1 syringe 1 (One) Time Per Week., Disp: 100 each, Rfl: 11    Syringe/Needle, Disp, (B-D 5CC LUER-KP SYR 20GX1\") 20G X 1\" 5 ML misc, USE ONCE WEEKLY, Disp: 10 each, Rfl: 11    Syringe/Needle, Disp, (BD Plastipak Syringe) 21G X 1\" 3 ML misc, Use 1 syringe 2 times per week, Disp: 24 each, Rfl: 5    Testosterone Cypionate (DEPOTESTOTERONE CYPIONATE) 200 MG/ML injection, INJECT 1 MILLILITER INTRAMUSCULARLY ONCE WEEKLY, Disp: 4 mL, Rfl: 0    Allergies:   No Known Allergies    Objective     Physical Exam:   Vital Signs:   Vitals:    10/04/24 0853   BP: 124/82   Pulse: 89   Resp: 20   Weight: 93.9 kg (207 lb)   Height: 182.9 cm (72.01\")     Body mass index is 28.07 kg/m².     Physical Exam  Vitals and nursing note reviewed.   Constitutional:       General: He is not in acute distress.     Appearance: Normal appearance. He is normal weight. He is not ill-appearing.   Pulmonary:      Effort: Pulmonary effort is normal. No respiratory distress.   Skin:     General: Skin is warm and dry.   Neurological:      General: No focal deficit present.      Mental Status: He is alert and oriented to person, place, and time.   Psychiatric:         Mood and Affect: Mood normal.         Behavior: Behavior normal.          Labs:   Brief Urine Lab Results       None                 Lab Results   Component Value Date    GLUCOSE 103 (H) 10/02/2024    CALCIUM 9.8 10/02/2024     10/02/2024    K 4.3 10/02/2024    CO2 27.0 10/02/2024     10/02/2024    BUN 21 (H) 10/02/2024    CREATININE 1.28 (H) 10/02/2024    EGFRIFAFRI 84 03/24/2020    EGFRIFNONA 69 03/24/2020    BCR 16.4 10/02/2024    ANIONGAP 11.0 10/02/2024       Lab Results " "  Component Value Date    WBC 6.41 03/24/2020    HGB 15.4 10/02/2024    HCT 45.0 10/02/2024    MCV 88.0 03/24/2020     03/24/2020       Images:   No Images in the past 120 days found..      Assessment / Plan      Assessment/Plan:   Diagnoses and all orders for this visit:    1. Hypogonadism in male (Primary)  -     Needle, Disp, (BD Eclipse Needle) 25G X 1\" misc; Use 2 Needles 2 (Two) Times a Week.  Dispense: 8 each; Refill: 12  -     Hemoglobin & Hematocrit, Blood; Future  -     Testosterone; Future  -     Estradiol; Future    2. Estradiol excess         50 yo male PMH HTN here for hypogonadism doing well on TRT. We reviewed labs are stable and therapeutic, adequate and hemoglobin are within normal limits estradiol is normal.  We will continue with current regimen and follow-up in 6 months with repeat labs.    Total Testosterone:  831  Estradiol:  8.5  Hematocrit:  45  Hemoglobin:  15.4      Continue testosterone cypionate 200 mg weekly  Continue Arimidex 1 time weekly  Obtain TT, estradiol, hematocrit and hemoglobin 6-month    Follow Up:   Return in about 6 months (around 4/4/2025) for Follow up SAMMI Spencer, NP-C  [facility]    "

## 2024-10-22 DIAGNOSIS — E29.1 HYPOGONADISM IN MALE: ICD-10-CM

## 2024-10-22 RX ORDER — TESTOSTERONE CYPIONATE 200 MG/ML
INJECTION, SOLUTION INTRAMUSCULAR
Qty: 4 ML | Refills: 0 | Status: SHIPPED | OUTPATIENT
Start: 2024-10-22

## 2024-11-16 DIAGNOSIS — E29.1 HYPOGONADISM IN MALE: ICD-10-CM

## 2024-11-18 RX ORDER — TESTOSTERONE CYPIONATE 200 MG/ML
INJECTION, SOLUTION INTRAMUSCULAR
Qty: 4 ML | Refills: 0 | Status: SHIPPED | OUTPATIENT
Start: 2024-11-18

## 2024-12-23 DIAGNOSIS — E29.1 HYPOGONADISM IN MALE: ICD-10-CM

## 2024-12-23 RX ORDER — TESTOSTERONE CYPIONATE 200 MG/ML
INJECTION, SOLUTION INTRAMUSCULAR
Qty: 4 ML | Refills: 0 | Status: SHIPPED | OUTPATIENT
Start: 2024-12-23

## 2024-12-23 NOTE — TELEPHONE ENCOUNTER
Patient is compliant with TRT testosterone replacement policy    Last OV visit 10/04/2024         Last LABS 10/02/2024           Next scheduled OV visit 04/16/2025    Refill due 12/16/2024    Confirmed pharmacy [facility]

## 2025-01-03 DIAGNOSIS — I10 ESSENTIAL HYPERTENSION: ICD-10-CM

## 2025-01-03 RX ORDER — MONTELUKAST SODIUM 10 MG/1
10 TABLET ORAL DAILY
Qty: 90 TABLET | Refills: 3 | Status: SHIPPED | OUTPATIENT
Start: 2025-01-03

## 2025-01-03 RX ORDER — LOSARTAN POTASSIUM AND HYDROCHLOROTHIAZIDE 25; 100 MG/1; MG/1
1 TABLET ORAL DAILY
Qty: 90 TABLET | Refills: 3 | Status: SHIPPED | OUTPATIENT
Start: 2025-01-03

## 2025-01-03 RX ORDER — CARVEDILOL 12.5 MG/1
12.5 TABLET ORAL 2 TIMES DAILY WITH MEALS
Qty: 180 TABLET | Refills: 3 | Status: SHIPPED | OUTPATIENT
Start: 2025-01-03

## 2025-01-28 DIAGNOSIS — E29.1 HYPOGONADISM IN MALE: ICD-10-CM

## 2025-01-29 RX ORDER — TESTOSTERONE CYPIONATE 200 MG/ML
INJECTION, SOLUTION INTRAMUSCULAR
Qty: 4 ML | Refills: 0 | Status: SHIPPED | OUTPATIENT
Start: 2025-01-29

## 2025-01-29 NOTE — TELEPHONE ENCOUNTER
Patient is compliant with TRT testosterone replacement policy    Last OV visit 10/04/2024         Last LABS 10/02/2024           Next scheduled OV visit 04/16/2025    Refill due 01/20    Confirmed pharmacy [facility]

## 2025-02-22 DIAGNOSIS — E29.1 HYPOGONADISM IN MALE: ICD-10-CM

## 2025-02-24 RX ORDER — SYRINGE WITH NEEDLE, 1 ML 25GX5/8"
SYRINGE, EMPTY DISPOSABLE MISCELLANEOUS
Qty: 24 EACH | Refills: 5 | Status: SHIPPED | OUTPATIENT
Start: 2025-02-24

## 2025-02-24 RX ORDER — TESTOSTERONE CYPIONATE 200 MG/ML
200 INJECTION, SOLUTION INTRAMUSCULAR
Qty: 4 ML | Refills: 0 | Status: SHIPPED | OUTPATIENT
Start: 2025-02-24

## 2025-02-24 NOTE — TELEPHONE ENCOUNTER
Patient is compliant with TRT testosterone replacement policy    Last OV visit 10/04/2024         Last LABS 10/02/2024           Next scheduled OV visit 04/16/2025    Refill due 02/26/2025    Confirmed pharmacy [facility]

## 2025-03-10 ENCOUNTER — LAB (OUTPATIENT)
Dept: LAB | Facility: HOSPITAL | Age: 50
End: 2025-03-10
Payer: COMMERCIAL

## 2025-03-10 DIAGNOSIS — E29.1 HYPOGONADISM IN MALE: ICD-10-CM

## 2025-03-10 LAB
ESTRADIOL SERPL HS-MCNC: 10.3 PG/ML
HCT VFR BLD AUTO: 41.8 % (ref 37.5–51)
HGB BLD-MCNC: 14.2 G/DL (ref 13–17.7)
TESTOST SERPL-MCNC: 1237 NG/DL (ref 249–836)

## 2025-03-10 PROCEDURE — 85014 HEMATOCRIT: CPT

## 2025-03-10 PROCEDURE — 36415 COLL VENOUS BLD VENIPUNCTURE: CPT

## 2025-03-10 PROCEDURE — 85018 HEMOGLOBIN: CPT

## 2025-03-10 PROCEDURE — 84403 ASSAY OF TOTAL TESTOSTERONE: CPT

## 2025-03-10 PROCEDURE — 82670 ASSAY OF TOTAL ESTRADIOL: CPT

## 2025-03-24 DIAGNOSIS — E29.1 HYPOGONADISM IN MALE: ICD-10-CM

## 2025-03-24 RX ORDER — FLUTICASONE PROPIONATE 50 MCG
SPRAY, SUSPENSION (ML) NASAL
Qty: 16 G | Refills: 3 | Status: SHIPPED | OUTPATIENT
Start: 2025-03-24

## 2025-03-24 RX ORDER — OMEPRAZOLE 20 MG/1
20 CAPSULE, DELAYED RELEASE ORAL DAILY
Qty: 90 CAPSULE | Refills: 3 | Status: SHIPPED | OUTPATIENT
Start: 2025-03-24

## 2025-03-26 RX ORDER — TESTOSTERONE CYPIONATE 200 MG/ML
200 INJECTION, SOLUTION INTRAMUSCULAR
Qty: 4 ML | Refills: 0 | Status: SHIPPED | OUTPATIENT
Start: 2025-03-26

## 2025-03-26 NOTE — TELEPHONE ENCOUNTER
Patient is compliant with TRT testosterone replacement policy     Last OV visit 10/04/2024          Last LABS 03/10/2025            Next scheduled OV visit 04/16/2025     Refill due 03/24/2025     Confirmed pharmacy [facility]

## 2025-04-07 ENCOUNTER — OFFICE VISIT (OUTPATIENT)
Dept: INTERNAL MEDICINE | Facility: CLINIC | Age: 50
End: 2025-04-07
Payer: COMMERCIAL

## 2025-04-07 VITALS
OXYGEN SATURATION: 97 % | RESPIRATION RATE: 18 BRPM | HEART RATE: 88 BPM | DIASTOLIC BLOOD PRESSURE: 86 MMHG | HEIGHT: 72 IN | SYSTOLIC BLOOD PRESSURE: 142 MMHG | TEMPERATURE: 97.5 F | BODY MASS INDEX: 27.36 KG/M2 | WEIGHT: 202 LBS

## 2025-04-07 DIAGNOSIS — Z00.00 ROUTINE GENERAL MEDICAL EXAMINATION AT A HEALTH CARE FACILITY: Primary | ICD-10-CM

## 2025-04-07 PROCEDURE — 99396 PREV VISIT EST AGE 40-64: CPT | Performed by: INTERNAL MEDICINE

## 2025-04-07 RX ORDER — CREATINE 100 %
POWDER (GRAM) MISCELLANEOUS
COMMUNITY

## 2025-04-07 RX ORDER — PARSLEY 450 MG
CAPSULE ORAL
COMMUNITY

## 2025-04-07 RX ORDER — MULTIVIT WITH MINERALS/LUTEIN
250 TABLET ORAL DAILY
COMMUNITY

## 2025-04-07 RX ORDER — OMEGA-3S/DHA/EPA/FISH OIL 300-1000MG
CAPSULE ORAL
COMMUNITY

## 2025-04-07 NOTE — PROGRESS NOTES
"Chief Complaint   Patient presents with    Cough     Has been seen at the [facility] and was on abx and steroid pack, \"still sorta feel fine'  but the nagging cough \"seems productive now\"    Annual Exam       Jan Vyas is a 49 y.o. male and is here for a comprehensive physical exam. The patient reports problems - htn, gerd .     History:  Erectile dysfunction  no  Nocturia  no      Do you take any herbs or supplements that were not prescribed by a doctor? yes    Are you taking aspirin daily? no      Health Habits:  Dental Exam. up to date  Eye Exam. unknown  Exercise: 6 times/week.  Current exercise activities include: cardiovascular workout on exercise equipment and weightlifting    Health Maintenance   Topic Date Due    COVID-19 Vaccine (4 - 2024-25 season) 09/01/2024    ANNUAL PHYSICAL  11/06/2024    INFLUENZA VACCINE  07/01/2025    TDAP/TD VACCINES (2 - Td or Tdap) 01/25/2029    COLORECTAL CANCER SCREENING  11/17/2033    HEPATITIS C SCREENING  Completed    Pneumococcal Vaccine 0-49  Aged Out       PMH, PSH, SocHx, FamHx, Allergies, and Medications: Reviewed and updated in the Visit Navigator.     No Known Allergies  Past Medical History:   Diagnosis Date    HTN (hypertension)     Hypogonadism male      Past Surgical History:   Procedure Laterality Date    COLONOSCOPY N/A 11/17/2023    Procedure: COLONOSCOPY with polypectomy and biopsy;  Surgeon: Irina Wright MD;  Location: [facility] ENDOSCOPY;  Service: Gastroenterology;  Laterality: N/A;    FOOT FRACTURE SURGERY      SHOULDER SURGERY  2014     Social History     Socioeconomic History    Marital status:    Tobacco Use    Smoking status: Never     Passive exposure: Never    Smokeless tobacco: Current     Types: Snuff   Vaping Use    Vaping status: Never Used   Substance and Sexual Activity    Alcohol use: Not Currently    Drug use: Never    Sexual activity: Defer     Family History   Problem Relation Age of Onset    Diabetes Mother     Hypertension " "Mother     Hypertension Father     Cancer Maternal Grandmother     Diabetes Maternal Grandmother     Cancer Maternal Grandfather     Hypertension Maternal Grandfather     Diabetes Maternal Grandfather     Heart disease Paternal Grandfather        Review of Systems  Review of Systems   Constitutional: Negative.  Negative for activity change, appetite change, fatigue and fever.   HENT:  Negative for congestion, ear discharge, ear pain and trouble swallowing.    Eyes:  Negative for photophobia and visual disturbance.   Respiratory:  Positive for cough. Negative for shortness of breath.    Cardiovascular:  Negative for chest pain and palpitations.   Gastrointestinal:  Negative for abdominal distention, abdominal pain, constipation, diarrhea, nausea and vomiting.   Endocrine: Negative.    Genitourinary:  Negative for dysuria, hematuria and urgency.   Musculoskeletal:  Positive for arthralgias. Negative for back pain, joint swelling and myalgias.   Skin:  Negative for color change and rash.   Allergic/Immunologic: Negative.    Neurological:  Negative for dizziness, weakness, light-headedness and headaches.   Hematological:  Negative for adenopathy. Does not bruise/bleed easily.   Psychiatric/Behavioral:  Negative for agitation, confusion and dysphoric mood. The patient is not nervous/anxious.        Vitals:    04/07/25 1044   BP: 142/86   Pulse: 88   Resp: 18   Temp: 97.5 °F (36.4 °C)   SpO2: 97%       Objective   /86   Pulse 88   Temp 97.5 °F (36.4 °C)   Resp 18   Ht 182.9 cm (72\")   Wt 91.6 kg (202 lb)   SpO2 97%   BMI 27.40 kg/m²     Physical Exam  Constitutional:       General: He is not in acute distress.     Appearance: He is well-developed.   HENT:      Nose: Nose normal.   Eyes:      General: No scleral icterus.     Conjunctiva/sclera: Conjunctivae normal.   Neck:      Thyroid: No thyromegaly.      Trachea: No tracheal deviation.   Cardiovascular:      Rate and Rhythm: Normal rate and regular rhythm. "      Heart sounds: No murmur heard.     No friction rub.   Pulmonary:      Effort: No respiratory distress.      Breath sounds: No wheezing or rales.   Abdominal:      General: There is no distension.      Palpations: Abdomen is soft. There is no mass.      Tenderness: There is no abdominal tenderness. There is no guarding.   Musculoskeletal:         General: Deformity present. Normal range of motion.   Lymphadenopathy:      Cervical: No cervical adenopathy.   Skin:     General: Skin is warm and dry.      Findings: No erythema or rash.   Neurological:      Mental Status: He is alert and oriented to person, place, and time.      Cranial Nerves: No cranial nerve deficit.      Coordination: Coordination normal.      Deep Tendon Reflexes: Reflexes are normal and symmetric.   Psychiatric:         Behavior: Behavior normal.         Thought Content: Thought content normal.         Judgment: Judgment normal.       The 10-year CVD risk score (JEANNA'Agoino, et al., 2008) is: 13.3%    Values used to calculate the score:      Age: 49 years      Sex: Male      Diabetic: No      Tobacco smoker: No      Systolic Blood Pressure: 142 mmHg      Is BP treated: Yes      HDL Cholesterol: 36 mg/dL      Total Cholesterol: 169 mg/dL    Lab Results   Component Value Date    CHOL 169 10/02/2024    CHLPL 137 12/15/2022    TRIG 161 (H) 10/02/2024    HDL 36 (L) 10/02/2024     (H) 10/02/2024     Glucose   Date Value Ref Range Status   10/02/2024 103 (H) 65 - 99 mg/dL Final     BUN   Date Value Ref Range Status   10/02/2024 21 (H) 6 - 20 mg/dL Final     Creatinine   Date Value Ref Range Status   10/02/2024 1.28 (H) 0.76 - 1.27 mg/dL Final     Sodium   Date Value Ref Range Status   10/02/2024 139 136 - 145 mmol/L Final     Potassium   Date Value Ref Range Status   10/02/2024 4.3 3.5 - 5.2 mmol/L Final     Chloride   Date Value Ref Range Status   10/02/2024 101 98 - 107 mmol/L Final     CO2   Date Value Ref Range Status   10/02/2024 27.0 22.0  - 29.0 mmol/L Final     Calcium   Date Value Ref Range Status   10/02/2024 9.8 8.6 - 10.5 mg/dL Final     Total Protein   Date Value Ref Range Status   10/02/2024 7.2 6.0 - 8.5 g/dL Final     Albumin   Date Value Ref Range Status   10/02/2024 4.6 3.5 - 5.2 g/dL Final     ALT (SGPT)   Date Value Ref Range Status   10/02/2024 24 1 - 41 U/L Final     AST (SGOT)   Date Value Ref Range Status   10/02/2024 22 1 - 40 U/L Final     Alkaline Phosphatase   Date Value Ref Range Status   10/02/2024 59 39 - 117 U/L Final     Total Bilirubin   Date Value Ref Range Status   10/02/2024 0.4 0.0 - 1.2 mg/dL Final     eGFR Non  Am   Date Value Ref Range Status   03/24/2020 69 >60 mL/min/1.73 Final     BUN/Creatinine Ratio   Date Value Ref Range Status   10/02/2024 16.4 7.0 - 25.0 Final     Anion Gap   Date Value Ref Range Status   10/02/2024 11.0 5.0 - 15.0 mmol/L Final     Lab Results   Component Value Date    WBC 6.41 03/24/2020    HGB 14.2 03/10/2025    HCT 41.8 03/10/2025    MCV 88.0 03/24/2020     03/24/2020       Assessment & Plan   1. Healthy male exam.   2. Patient Counseling: Including but not Limited to the following, when appropriate:  --Nutrition: Stressed importance of moderation in sodium/caffeine intake, saturated fat and cholesterol, caloric balance, sufficient intake of fresh fruits, vegetables, fiber  .  --Exercise: Stressed the importance of regular exercise.   --Substance Abuse: Discussed cessation/primary prevention of tobacco, alcohol, or other drug use; driving or other dangerous activities under the influence; availability of treatment for abuse, as indicated based on social history.    --Sexuality: Discussed sexually transmitted diseases, partner selection, use of condoms and contraceptive alternatives.   --Injury prevention: Discussed safety belts, safety helmets, smoke detector, smoking near bedding or upholstery.   --Dental health: Discussed importance of regular tooth brushing, flossing, and  dental visits.  --Immunizations reviewed.  --Discussed benefits of colon cancer screening.      3. Discussed the patient's BMI with him. BMI is >= 25 and <30. (Overweight) The following options were offered after discussion;: exercise counseling/recommendations and nutrition counseling/recommendations  . The BMI is above average; BMI management plan is completed  4. No follow-ups on file.  5. Age-appropriate Screening Scheduled  6. There are no Patient Instructions on file for this visit.    Assessment & Plan     Diagnoses and all orders for this visit:    1. Routine general medical examination at a health care facility (Primary)

## 2025-04-09 ENCOUNTER — OFFICE VISIT (OUTPATIENT)
Dept: UROLOGY | Facility: CLINIC | Age: 50
End: 2025-04-09
Payer: COMMERCIAL

## 2025-04-09 VITALS
DIASTOLIC BLOOD PRESSURE: 76 MMHG | RESPIRATION RATE: 18 BRPM | SYSTOLIC BLOOD PRESSURE: 132 MMHG | BODY MASS INDEX: 27.36 KG/M2 | TEMPERATURE: 97.5 F | OXYGEN SATURATION: 97 % | HEIGHT: 72 IN | WEIGHT: 202 LBS | HEART RATE: 88 BPM

## 2025-04-09 DIAGNOSIS — E29.1 HYPOGONADISM IN MALE: Primary | ICD-10-CM

## 2025-04-09 DIAGNOSIS — E28.0 ESTRADIOL EXCESS: ICD-10-CM

## 2025-04-09 NOTE — PROGRESS NOTES
Office Visit     Patient Name: Jan Vyas  : 1975   MRN: [id number]     Patient or patient representative verbalized consent for the use of Ambient Listening during the visit with  SAMMI Ladd for chart documentation. 2025  09:11 EDT    Chief Complaint:   Chief Complaint   Patient presents with    Hypogonadism     Referring Provider: No ref. provider found    Primary Care Provider: Lázaro Hadley MD     History of Present Illness  The patient is a 49-year-old male who presents for a follow-up regarding hypogonadism.    Hypogonadism  - No complications with biweekly testosterone injections.  - Concerned about potential scar tissue at the injection site.  - Elevated testosterone level attributed to recent injection on Friday night or Saturday morning.  - New supplements: beetroot powder, L-citrulline, magnesium, ashwagandha, glycine.  - Lab tests typically at 10:00 AM.  - Current medications: anastrozole, half tablet on  and .    Supplemental information: Has a cold, saw Dr. Hadley yesterday.       Subjective   Review of System:   As noted in HPI.    Past Medical History:   Diagnosis Date    HTN (hypertension)     Hypogonadism male      Past Surgical History:   Procedure Laterality Date    COLONOSCOPY N/A 2023    Procedure: COLONOSCOPY with polypectomy and biopsy;  Surgeon: Irina Wright MD;  Location: [facility] ENDOSCOPY;  Service: Gastroenterology;  Laterality: N/A;    FOOT FRACTURE SURGERY      SHOULDER SURGERY       Family History   Problem Relation Age of Onset    Diabetes Mother     Hypertension Mother     Hypertension Father     Cancer Maternal Grandmother     Diabetes Maternal Grandmother     Cancer Maternal Grandfather     Hypertension Maternal Grandfather     Diabetes Maternal Grandfather     Heart disease Paternal Grandfather      Social History     Socioeconomic History    Marital status:    Tobacco Use    Smoking status: Never      "Passive exposure: Never    Smokeless tobacco: Current     Types: Snuff   Vaping Use    Vaping status: Never Used   Substance and Sexual Activity    Alcohol use: Not Currently    Drug use: Never    Sexual activity: Defer       Current Outpatient Medications:     Alcohol Swabs pads, Clean area prior to injection, Disp: 100 each, Rfl: 11    anastrozole (ARIMIDEX) 1 MG tablet, TAKE 1 TABLET BY MOUTH ONCE WEEKLY, Disp: 12 tablet, Rfl: 3    Ashwagandha 500 MG capsule, Take  by mouth., Disp: , Rfl:     carvedilol (COREG) 12.5 MG tablet, Take 1 tablet by mouth 2 (Two) Times a Day With Meals., Disp: 180 tablet, Rfl: 3    Creatine powder, Use., Disp: , Rfl:     fluticasone (FLONASE) 50 MCG/ACT nasal spray, SPRAY 2 SPRAYS IN EACH NOSTRIL ONCE DAILY, Disp: 16 g, Rfl: 3    ibuprofen (ADVIL,MOTRIN) 800 MG tablet, Take 1 tablet by mouth Every 6 (Six) Hours As Needed., Disp: , Rfl:     levocetirizine (XYZAL) 5 MG tablet, TAKE ONE TABLET BY MOUTH EVERY EVENING, Disp: 90 tablet, Rfl: 3    losartan-hydrochlorothiazide (HYZAAR) 100-25 MG per tablet, TAKE 1 TABLET BY MOUTH DAILY, Disp: 90 tablet, Rfl: 3    magnesium chloride ER 64 MG DR tablet, Take  by mouth Daily., Disp: , Rfl:     Misc Natural Products (BEET ROOT PO), Take  by mouth., Disp: , Rfl:     montelukast (SINGULAIR) 10 MG tablet, TAKE 1 TABLET BY MOUTH DAILY, Disp: 90 tablet, Rfl: 3    Needle, Disp, (BD Disp Needle) 23G X 1\" misc, Use to inject 1 time weekly, Disp: 50 each, Rfl: 11    Needle, Disp, (BD Disp Needle) 23G X 1\" misc, USE 1 NEEDLE TWICE WEEKLY FOR INJECTION, Disp: 50 each, Rfl: 11    Needle, Disp, (BD Disp Needles) 18G X 1-1/2\" misc, USE FOR DRAWING UP THE MEDICATION, Disp: 12 each, Rfl: 11    Needle, Disp, (BD Eclipse Needle) 25G X 1\" misc, Use 2 Needles 2 (Two) Times a Week., Disp: 8 each, Rfl: 12    Omega-3 Fatty Acids (Omega-3 Fish Oil) 300 MG capsule, Take  by mouth., Disp: , Rfl:     omeprazole (priLOSEC) 20 MG capsule, TAKE 1 CAPSULE BY MOUTH DAILY, Disp: " "90 capsule, Rfl: 3    SUMAtriptan (IMITREX) 50 MG tablet, TAKE ONE TABLET BY MOUTH AT ONSET OF HEADACHE; MAY REPEAT ONE TABLET IN 2 HOURS IF HEADACHE NOT RELIEVED. (Patient taking differently: Take 1 tablet by mouth 1 (One) Time As Needed. TAKE ONE TABLET BY MOUTH AT ONSET OF HEADACHE; MAY REPEAT ONE TABLET IN 2 HOURS IF HEADACHE NOT RELIEVED.), Disp: 9 tablet, Rfl: 2    Syringe, Disposable, 3 ML misc, 1 syringe 1 (One) Time Per Week., Disp: 100 each, Rfl: 11    Syringe/Needle, Disp, (B-D 5CC LUER-KP SYR 20GX1\") 20G X 1\" 5 ML misc, USE ONCE WEEKLY, Disp: 10 each, Rfl: 11    Syringe/Needle, Disp, (BD Plastipak Syringe) 21G X 1\" 3 ML misc, USE ONE SYRINGE TWO TIMES PER WEEK, Disp: 24 each, Rfl: 5    Testosterone Cypionate (DEPOTESTOTERONE CYPIONATE) 200 MG/ML injection, INJECT 1 ML INTRAMUSCULARLY ONCE WEEKLY, Disp: 4 mL, Rfl: 0    vitamin C (ASCORBIC ACID) 250 MG tablet, Take 1 tablet by mouth Daily., Disp: , Rfl:     No Known Allergies  Objective   Visit Vitals  /76   Pulse 88   Temp 97.5 °F (36.4 °C)   Resp 18   Ht 182.9 cm (72.01\")   Wt 91.6 kg (202 lb)   SpO2 97%   BMI 27.39 kg/m²        Body mass index is 27.39 kg/m².     Physical Exam  Vitals and nursing note reviewed.   Constitutional:       General: He is not in acute distress.     Appearance: Normal appearance. He is well-groomed and normal weight. He is not ill-appearing.   Pulmonary:      Effort: Pulmonary effort is normal. No respiratory distress.   Skin:     General: Skin is warm and dry.   Neurological:      General: No focal deficit present.      Mental Status: He is alert and oriented to person, place, and time.   Psychiatric:         Mood and Affect: Mood normal.         Behavior: Behavior normal.          Labs  No results found for: \"COLORU\", \"CLARITYU\", \"SPECGRAV\", \"PHUR\", \"LEUKOCYTESUR\", \"NITRITE\", \"PROTEINPOCUA\", \"GLUCOSEUR\", \"KETONESU\", \"UROBILINOGEN\", \"BILIRUBINUR\", \"RBCUR\"   No results found for: \"WBCUA\", \"RBCUA\", \"BACTERIA\", " "\"HYALCASTU\", \"SQUAMEPIUA\"     Lab Results   Component Value Date    WBC 6.41 03/24/2020    HGB 14.2 03/10/2025    HCT 41.8 03/10/2025    MCV 88.0 03/24/2020     03/24/2020     Lab Results   Component Value Date    GLUCOSE 103 (H) 10/02/2024    CALCIUM 9.8 10/02/2024     10/02/2024    K 4.3 10/02/2024    CO2 27.0 10/02/2024     10/02/2024    BUN 21 (H) 10/02/2024    BUN 25 (H) 12/15/2022    CREATININE 1.28 (H) 10/02/2024    CREATININE 1.19 12/15/2022    EGFR 69.0 10/02/2024    EGFR 76.3 12/15/2022    BCR 16.4 10/02/2024    ANIONGAP 11.0 10/02/2024    ALT 24 10/02/2024    AST 22 10/02/2024       No results found for: \"HGBA1C\"     Lab Results   Component Value Date    PSA 0.315 10/02/2024    PSA 0.216 03/21/2024    PSA 0.205 02/22/2023       No results found for: \"URICACIDSTN\", \"IVFY7KVIQUM\", \"PNMF3RYNHV\", \"LABMAGN\"  No results found for: \"UHMV67KT\", \"CAION\", \"PTH\", \"URICACID\"  No results found for: \"CYSTINE\", \"URINEVOLUM\", \"CALCIUMUR\", \"OXALATEU\", \"CITRATEUR\", \"LABPH\", \"URICUR\", \"NAUR\", \"KUR\", \"MAGNESIUMUR\", \"PHOSUR\", \"AMMONIUMUR\", \"CLUR\", \"[id number]\", \"UREAUR\", \"LABCREAUR\"    Lab Results   Component Value Date    TESTOSTEROTT 789 06/07/2023    TESTOSTEROTT 830 01/12/2023    TESTOSTEROTT 243 (L) 09/30/2022    TESTFRE 17.5 06/07/2023    TESTFRE 15.0 01/12/2023    TESTFRE 5.1 (L) 09/30/2022    PSA 0.315 10/02/2024    ESTRADIOL 10.3 03/10/2025    FSH <0.3 (L) 06/07/2023       Lab Results   Component Value Date    FSH <0.3 (L) 06/07/2023    TSH 2.380 03/24/2020    FREET4 1.35 03/24/2020    TESTOSTEROTT 789 06/07/2023    TESTFRE 17.5 06/07/2023         Radiographic Studies  No Images in the past 120 days found..    I have reviewed the above labs and imaging.       Assessment / Plan      Diagnoses and all orders for this visit:    1. Hypogonadism in male (Primary)  -     Testosterone; Future  -     Hemoglobin & Hematocrit, Blood; Future  -     PSA DIAGNOSTIC; Future    2. Estradiol excess     "     Assessment & Plan  1. Hypogonadism: Stable. Elevated testosterone likely due to injection timing relative to lab work and anastrozole use. Hematocrit normal at 41.8.  - Sinew testosterone cypionate 200 mg total weekly split into 2 injections  - Testosterone, hematocrit and hemoglobin, PSA test in October.    2.  Estradiol excess  - Consider discontinuing anastrozole for 6 months and trying DIM.  - Use  mg daily to balance estrogen levels without bone loss risk.    Follow-up  - PSA test in October.       Return in about 6 months (around 10/9/2025) for Armand Cifuentes, MSN, APRN, FNP-C  [facility]

## 2025-04-23 DIAGNOSIS — E29.1 HYPOGONADISM IN MALE: ICD-10-CM

## 2025-04-23 RX ORDER — TESTOSTERONE CYPIONATE 200 MG/ML
INJECTION, SOLUTION INTRAMUSCULAR
Qty: 4 ML | Refills: 0 | Status: SHIPPED | OUTPATIENT
Start: 2025-04-23

## 2025-05-21 DIAGNOSIS — E29.1 HYPOGONADISM IN MALE: ICD-10-CM

## 2025-05-22 RX ORDER — ANASTROZOLE 1 MG/1
1 TABLET ORAL WEEKLY
Qty: 12 TABLET | Refills: 3 | Status: SHIPPED | OUTPATIENT
Start: 2025-05-22

## 2025-05-27 DIAGNOSIS — E29.1 HYPOGONADISM IN MALE: ICD-10-CM

## 2025-05-27 RX ORDER — TESTOSTERONE CYPIONATE 200 MG/ML
200 INJECTION, SOLUTION INTRAMUSCULAR
Qty: 4 ML | Refills: 0 | Status: SHIPPED | OUTPATIENT
Start: 2025-05-27

## 2025-07-05 DIAGNOSIS — E29.1 HYPOGONADISM IN MALE: ICD-10-CM

## 2025-07-07 RX ORDER — LEVOCETIRIZINE DIHYDROCHLORIDE 5 MG/1
5 TABLET, FILM COATED ORAL EVERY EVENING
Qty: 90 TABLET | Refills: 3 | Status: SHIPPED | OUTPATIENT
Start: 2025-07-07

## 2025-07-07 RX ORDER — TESTOSTERONE CYPIONATE 200 MG/ML
INJECTION, SOLUTION INTRAMUSCULAR
Qty: 4 ML | Refills: 0 | Status: SHIPPED | OUTPATIENT
Start: 2025-07-07

## 2025-07-07 NOTE — TELEPHONE ENCOUNTER
Patient is compliant with TRT testosterone replacement policy     Last OV visit     04/09/2025      Last LABS        03/10/2025     Next scheduled OV visit 10/09/2025     Refill due 06/24/2025     Confirmed pharmacy [facility]

## 2025-07-31 ENCOUNTER — OFFICE VISIT (OUTPATIENT)
Dept: INTERNAL MEDICINE | Facility: CLINIC | Age: 50
End: 2025-07-31
Payer: COMMERCIAL

## 2025-07-31 VITALS
OXYGEN SATURATION: 98 % | SYSTOLIC BLOOD PRESSURE: 120 MMHG | HEART RATE: 60 BPM | WEIGHT: 214 LBS | RESPIRATION RATE: 16 BRPM | BODY MASS INDEX: 28.99 KG/M2 | DIASTOLIC BLOOD PRESSURE: 82 MMHG | HEIGHT: 72 IN | TEMPERATURE: 97.1 F

## 2025-07-31 DIAGNOSIS — M10.9 ACUTE GOUT OF RIGHT FOOT, UNSPECIFIED CAUSE: Primary | ICD-10-CM

## 2025-07-31 PROCEDURE — 99213 OFFICE O/P EST LOW 20 MIN: CPT | Performed by: STUDENT IN AN ORGANIZED HEALTH CARE EDUCATION/TRAINING PROGRAM

## 2025-07-31 RX ORDER — PREDNISONE 20 MG/1
40 TABLET ORAL DAILY
Qty: 10 TABLET | Refills: 0 | Status: SHIPPED | OUTPATIENT
Start: 2025-07-31 | End: 2025-08-05

## 2025-07-31 NOTE — PROGRESS NOTES
Subjective   Jan Vyas is a 50 y.o. male.     History of Present Illness  Noted right heel pain yesterday morning. Last night as getting into bed noted it was tender to rub against the sheets. Woke up this morning to it red and swollen. No injury or trauma. No bites noted      The following portions of the patient's history were reviewed and updated as appropriate: allergies, current medications, past family history, past medical history, past social history, past surgical history, and problem list.    Review of Systems   All other systems reviewed and are negative.      Objective   Physical Exam  Vitals and nursing note reviewed.   Constitutional:       Appearance: Normal appearance. He is normal weight.   HENT:      Head: Normocephalic and atraumatic.      Right Ear: External ear normal.      Left Ear: External ear normal.      Nose: Nose normal.      Mouth/Throat:      Mouth: Mucous membranes are moist.      Pharynx: Oropharynx is clear.   Eyes:      Extraocular Movements: Extraocular movements intact.      Conjunctiva/sclera: Conjunctivae normal.      Pupils: Pupils are equal, round, and reactive to light.   Cardiovascular:      Rate and Rhythm: Normal rate and regular rhythm.      Pulses: Normal pulses.      Heart sounds: Normal heart sounds. No murmur heard.     No gallop.   Pulmonary:      Effort: Pulmonary effort is normal. No respiratory distress.      Breath sounds: Normal breath sounds. No wheezing, rhonchi or rales.   Abdominal:      General: Abdomen is flat. Bowel sounds are normal.      Palpations: Abdomen is soft.   Musculoskeletal:         General: Normal range of motion.      Cervical back: Normal range of motion and neck supple. No rigidity or tenderness.   Lymphadenopathy:      Cervical: No cervical adenopathy.   Skin:     General: Skin is warm.      Capillary Refill: Capillary refill takes less than 2 seconds.      Coloration: Skin is not jaundiced or pale.      Findings: No bruising,  erythema, lesion or rash.   Neurological:      General: No focal deficit present.      Mental Status: He is alert and oriented to person, place, and time. Mental status is at baseline.   Psychiatric:         Mood and Affect: Mood normal.         Behavior: Behavior normal.         Thought Content: Thought content normal.         Judgment: Judgment normal.         Assessment & Plan   Diagnoses and all orders for this visit:    1. Acute gout of right foot, unspecified cause (Primary)  -     predniSONE (DELTASONE) 20 MG tablet; Take 2 tablets by mouth Daily for 5 days.  Dispense: 10 tablet; Refill: 0

## 2025-08-14 RX ORDER — FLUTICASONE PROPIONATE 50 MCG
SPRAY, SUSPENSION (ML) NASAL
Qty: 16 G | Refills: 3 | Status: SHIPPED | OUTPATIENT
Start: 2025-08-14

## 2025-08-18 DIAGNOSIS — E29.1 HYPOGONADISM IN MALE: ICD-10-CM

## 2025-08-19 RX ORDER — TESTOSTERONE CYPIONATE 200 MG/ML
200 INJECTION, SOLUTION INTRAMUSCULAR
Qty: 4 ML | Refills: 0 | Status: SHIPPED | OUTPATIENT
Start: 2025-08-19

## 2025-08-29 ENCOUNTER — OFFICE VISIT (OUTPATIENT)
Dept: INTERNAL MEDICINE | Facility: CLINIC | Age: 50
End: 2025-08-29
Payer: COMMERCIAL

## 2025-08-29 VITALS
BODY MASS INDEX: 27.63 KG/M2 | WEIGHT: 204 LBS | HEIGHT: 72 IN | DIASTOLIC BLOOD PRESSURE: 84 MMHG | OXYGEN SATURATION: 98 % | HEART RATE: 78 BPM | SYSTOLIC BLOOD PRESSURE: 128 MMHG | TEMPERATURE: 98 F

## 2025-08-29 DIAGNOSIS — M79.674 PAIN OF TOE OF RIGHT FOOT: Primary | ICD-10-CM

## 2025-08-29 PROCEDURE — 99214 OFFICE O/P EST MOD 30 MIN: CPT | Performed by: INTERNAL MEDICINE

## (undated) DEVICE — VLV SXN AIR/H2O ORCAPOD3 1P/U STRL

## (undated) DEVICE — ENDOSCOPY PORT CONNECTOR FOR OLYMPUS® SCOPES: Brand: ERBE

## (undated) DEVICE — FRCP BX RADJAW4 NDL 2.8 240 STD OG

## (undated) DEVICE — LUBE JELLY PK/2.75GM STRL BX/144

## (undated) DEVICE — Device

## (undated) DEVICE — HYBRID TUBING/CAP SET FOR OLYMPUS® SCOPES: Brand: ERBE